# Patient Record
Sex: FEMALE | Race: WHITE | NOT HISPANIC OR LATINO | Employment: OTHER | URBAN - NONMETROPOLITAN AREA
[De-identification: names, ages, dates, MRNs, and addresses within clinical notes are randomized per-mention and may not be internally consistent; named-entity substitution may affect disease eponyms.]

---

## 2017-03-19 ENCOUNTER — HOSPITAL ENCOUNTER (EMERGENCY)
Facility: HOSPITAL | Age: 77
Discharge: HOME | End: 2017-03-19
Attending: EMERGENCY MEDICINE | Admitting: EMERGENCY MEDICINE
Payer: MEDICARE

## 2017-03-19 ENCOUNTER — APPOINTMENT (EMERGENCY)
Dept: RADIOLOGY | Facility: HOSPITAL | Age: 77
End: 2017-03-19
Payer: MEDICARE

## 2017-03-19 VITALS
OXYGEN SATURATION: 96 % | BODY MASS INDEX: 31.58 KG/M2 | HEIGHT: 64 IN | HEART RATE: 64 BPM | DIASTOLIC BLOOD PRESSURE: 65 MMHG | TEMPERATURE: 97.8 F | WEIGHT: 185 LBS | SYSTOLIC BLOOD PRESSURE: 143 MMHG | RESPIRATION RATE: 18 BRPM

## 2017-03-19 DIAGNOSIS — M70.62 TROCHANTERIC BURSITIS OF LEFT HIP: ICD-10-CM

## 2017-03-19 DIAGNOSIS — M79.672 LEFT FOOT PAIN: ICD-10-CM

## 2017-03-19 DIAGNOSIS — M25.552 LEFT HIP PAIN: Primary | ICD-10-CM

## 2017-03-19 PROCEDURE — 99283 EMERGENCY DEPT VISIT LOW MDM: CPT

## 2017-03-19 PROCEDURE — 73630 X-RAY EXAM OF FOOT: CPT

## 2017-03-19 PROCEDURE — 73552 X-RAY EXAM OF FEMUR 2/>: CPT

## 2017-03-19 PROCEDURE — 73502 X-RAY EXAM HIP UNI 2-3 VIEWS: CPT

## 2017-03-19 RX ORDER — METOPROLOL SUCCINATE 25 MG/1
25 TABLET, EXTENDED RELEASE ORAL DAILY
COMMUNITY

## 2017-03-19 RX ORDER — PREDNISONE 20 MG/1
40 TABLET ORAL ONCE
Status: COMPLETED | OUTPATIENT
Start: 2017-03-19 | End: 2017-03-19

## 2017-03-19 RX ORDER — NAPROXEN 250 MG/1
500 TABLET ORAL ONCE
Status: COMPLETED | OUTPATIENT
Start: 2017-03-19 | End: 2017-03-19

## 2017-03-19 RX ORDER — AMLODIPINE BESYLATE 10 MG/1
10 TABLET ORAL DAILY
COMMUNITY

## 2017-03-19 RX ORDER — PREDNISONE 20 MG/1
40 TABLET ORAL DAILY
Qty: 8 TABLET | Refills: 0 | Status: SHIPPED | OUTPATIENT
Start: 2017-03-19 | End: 2017-03-23

## 2017-03-19 RX ORDER — TRAMADOL HYDROCHLORIDE 50 MG/1
50 TABLET ORAL ONCE
Status: COMPLETED | OUTPATIENT
Start: 2017-03-19 | End: 2017-03-19

## 2017-03-19 RX ORDER — LIDOCAINE 50 MG/G
1 PATCH TOPICAL EVERY 24 HOURS
Qty: 30 PATCH | Refills: 0 | Status: SHIPPED | OUTPATIENT
Start: 2017-03-19 | End: 2017-04-18

## 2017-03-19 RX ADMIN — NAPROXEN 500 MG: 250 TABLET ORAL at 14:23

## 2017-03-19 RX ADMIN — PREDNISONE 40 MG: 20 TABLET ORAL at 15:31

## 2017-03-19 RX ADMIN — TRAMADOL HYDROCHLORIDE 50 MG: 50 TABLET, COATED ORAL at 14:23

## 2017-10-09 ENCOUNTER — HOSPITAL ENCOUNTER (OUTPATIENT)
Dept: CT IMAGING | Age: 77
Discharge: HOME | End: 2017-10-09
Attending: INTERNAL MEDICINE
Payer: MEDICARE

## 2017-10-09 DIAGNOSIS — M79.10 MYALGIA: ICD-10-CM

## 2017-10-09 DIAGNOSIS — M51.36 DEGENERATIVE DISC DISEASE, LUMBAR: ICD-10-CM

## 2017-10-09 PROCEDURE — 72131 CT LUMBAR SPINE W/O DYE: CPT

## 2017-12-08 VITALS
RESPIRATION RATE: 20 BRPM | OXYGEN SATURATION: 94 % | DIASTOLIC BLOOD PRESSURE: 62 MMHG | SYSTOLIC BLOOD PRESSURE: 101 MMHG | TEMPERATURE: 98 F | HEART RATE: 62 BPM

## 2017-12-08 LAB
ALBUMIN SERPL ELPH-MCNC: 4 G/DL — SIGNIFICANT CHANGE UP (ref 3.4–5)
ALP SERPL-CCNC: 209 U/L — HIGH (ref 40–120)
ALT FLD-CCNC: 146 U/L — HIGH (ref 12–42)
ANION GAP SERPL CALC-SCNC: 12 MMOL/L — SIGNIFICANT CHANGE UP (ref 9–16)
AST SERPL-CCNC: 189 U/L — HIGH (ref 15–37)
BASOPHILS NFR BLD AUTO: 0.3 % — SIGNIFICANT CHANGE UP (ref 0–2)
BILIRUB SERPL-MCNC: 1.6 MG/DL — HIGH (ref 0.2–1.2)
BUN SERPL-MCNC: 57 MG/DL — HIGH (ref 7–23)
CALCIUM SERPL-MCNC: 9.2 MG/DL — SIGNIFICANT CHANGE UP (ref 8.5–10.5)
CHLORIDE SERPL-SCNC: 101 MMOL/L — SIGNIFICANT CHANGE UP (ref 96–108)
CO2 SERPL-SCNC: 25 MMOL/L — SIGNIFICANT CHANGE UP (ref 22–31)
CREAT SERPL-MCNC: 1.58 MG/DL — HIGH (ref 0.5–1.3)
EOSINOPHIL NFR BLD AUTO: 1.1 % — SIGNIFICANT CHANGE UP (ref 0–6)
GLUCOSE SERPL-MCNC: 159 MG/DL — HIGH (ref 70–99)
HCT VFR BLD CALC: 37.7 % — SIGNIFICANT CHANGE UP (ref 34.5–45)
HGB BLD-MCNC: 12.6 G/DL — SIGNIFICANT CHANGE UP (ref 11.5–15.5)
IMM GRANULOCYTES NFR BLD AUTO: 1 % — SIGNIFICANT CHANGE UP (ref 0–1.5)
LIDOCAIN IGE QN: 815 U/L — HIGH (ref 73–393)
LYMPHOCYTES # BLD AUTO: 9.5 % — LOW (ref 13–44)
MCHC RBC-ENTMCNC: 31.3 PG — SIGNIFICANT CHANGE UP (ref 27–34)
MCHC RBC-ENTMCNC: 33.4 G/DL — SIGNIFICANT CHANGE UP (ref 32–36)
MCV RBC AUTO: 93.8 FL — SIGNIFICANT CHANGE UP (ref 80–100)
MONOCYTES NFR BLD AUTO: 7.2 % — SIGNIFICANT CHANGE UP (ref 2–14)
NEUTROPHILS NFR BLD AUTO: 80.9 % — HIGH (ref 43–77)
PLATELET # BLD AUTO: 277 K/UL — SIGNIFICANT CHANGE UP (ref 150–400)
POTASSIUM SERPL-MCNC: 3.3 MMOL/L — LOW (ref 3.5–5.3)
POTASSIUM SERPL-SCNC: 3.3 MMOL/L — LOW (ref 3.5–5.3)
PROT SERPL-MCNC: 7.5 G/DL — SIGNIFICANT CHANGE UP (ref 6.4–8.2)
RBC # BLD: 4.02 M/UL — SIGNIFICANT CHANGE UP (ref 3.8–5.2)
RBC # FLD: 12.6 % — SIGNIFICANT CHANGE UP (ref 10.3–16.9)
SODIUM SERPL-SCNC: 138 MMOL/L — SIGNIFICANT CHANGE UP (ref 132–145)
WBC # BLD: 7 K/UL — SIGNIFICANT CHANGE UP (ref 3.8–10.5)
WBC # FLD AUTO: 7 K/UL — SIGNIFICANT CHANGE UP (ref 3.8–10.5)

## 2017-12-08 PROCEDURE — 99285 EMERGENCY DEPT VISIT HI MDM: CPT

## 2017-12-08 RX ORDER — ONDANSETRON 8 MG/1
4 TABLET, FILM COATED ORAL ONCE
Qty: 0 | Refills: 0 | Status: COMPLETED | OUTPATIENT
Start: 2017-12-08 | End: 2017-12-08

## 2017-12-08 RX ORDER — FAMOTIDINE 10 MG/ML
20 INJECTION INTRAVENOUS ONCE
Qty: 0 | Refills: 0 | Status: COMPLETED | OUTPATIENT
Start: 2017-12-08 | End: 2017-12-08

## 2017-12-08 RX ORDER — CLOPIDOGREL BISULFATE 75 MG/1
1 TABLET, FILM COATED ORAL
Qty: 0 | Refills: 0 | COMMUNITY

## 2017-12-08 RX ORDER — METOPROLOL TARTRATE 50 MG
1 TABLET ORAL
Qty: 0 | Refills: 0 | COMMUNITY

## 2017-12-08 RX ORDER — OMEPRAZOLE 10 MG/1
1 CAPSULE, DELAYED RELEASE ORAL
Qty: 0 | Refills: 0 | COMMUNITY

## 2017-12-08 RX ORDER — IOHEXOL 300 MG/ML
50 INJECTION, SOLUTION INTRAVENOUS ONCE
Qty: 0 | Refills: 0 | Status: COMPLETED | OUTPATIENT
Start: 2017-12-08 | End: 2017-12-08

## 2017-12-08 RX ORDER — SODIUM CHLORIDE 9 MG/ML
1000 INJECTION INTRAMUSCULAR; INTRAVENOUS; SUBCUTANEOUS ONCE
Qty: 0 | Refills: 0 | Status: COMPLETED | OUTPATIENT
Start: 2017-12-08 | End: 2017-12-08

## 2017-12-08 RX ORDER — FUROSEMIDE 40 MG
1 TABLET ORAL
Qty: 0 | Refills: 0 | COMMUNITY

## 2017-12-08 RX ADMIN — ONDANSETRON 4 MILLIGRAM(S): 8 TABLET, FILM COATED ORAL at 21:24

## 2017-12-08 RX ADMIN — IOHEXOL 50 MILLILITER(S): 300 INJECTION, SOLUTION INTRAVENOUS at 21:24

## 2017-12-08 RX ADMIN — FAMOTIDINE 20 MILLIGRAM(S): 10 INJECTION INTRAVENOUS at 21:24

## 2017-12-08 RX ADMIN — SODIUM CHLORIDE 1000 MILLILITER(S): 9 INJECTION INTRAMUSCULAR; INTRAVENOUS; SUBCUTANEOUS at 21:24

## 2017-12-08 NOTE — ED PROVIDER NOTE - ENMT, MLM
Airway patent, Nasal mucosa clear. Mouth with normal mucosa. Throat has no vesicles, no oropharyngeal exudates and uvula is midline. appears dehydrated

## 2017-12-08 NOTE — ED ADULT TRIAGE NOTE - CHIEF COMPLAINT QUOTE
BIBA for nausea and vomiting since this morning. Denies any abdominal pain. States she began to feel weak and decided to call 911.

## 2017-12-08 NOTE — ED PROVIDER NOTE - OBJECTIVE STATEMENT
77 y.o. female with c/o N/V acute onset this morning, ate burger last night, has been on a low carb diet for several months, with weight loss (intentional), able to eat and drink some liquids over the course of the day but nausea was persistent, assoc with gen weakness and fatigue, near syncope today [place] [employer] [place].

## 2017-12-08 NOTE — ED PROVIDER NOTE - MEDICAL DECISION MAKING DETAILS
labs noted with elevated LFTs, TB, lipase, c/w possible gallstone pancreatitis/ biliary ductal blockage, CT abdomen was read as changes c/w post-cholecystectomy state.  I consulted surgery dr chicas and he recc admitting patient for further workup, as she may need additional imaging/workup.

## 2017-12-09 ENCOUNTER — INPATIENT (INPATIENT)
Facility: HOSPITAL | Age: 77
LOS: 0 days | Discharge: HOME | DRG: 446 | End: 2017-12-10
Attending: SURGERY | Admitting: SURGERY
Payer: COMMERCIAL

## 2017-12-09 DIAGNOSIS — I67.1 CEREBRAL ANEURYSM, NONRUPTURED: Chronic | ICD-10-CM

## 2017-12-09 DIAGNOSIS — Z98.890 OTHER SPECIFIED POSTPROCEDURAL STATES: Chronic | ICD-10-CM

## 2017-12-09 LAB
ALBUMIN SERPL ELPH-MCNC: 3.2 G/DL — LOW (ref 3.3–5)
ALP SERPL-CCNC: 137 U/L — HIGH (ref 40–120)
ALT FLD-CCNC: 88 U/L — HIGH (ref 10–45)
ANION GAP SERPL CALC-SCNC: 13 MMOL/L — SIGNIFICANT CHANGE UP (ref 5–17)
APTT BLD: 32.3 SEC — SIGNIFICANT CHANGE UP (ref 27.5–37.4)
AST SERPL-CCNC: 84 U/L — HIGH (ref 10–40)
BILIRUB SERPL-MCNC: 0.8 MG/DL — SIGNIFICANT CHANGE UP (ref 0.2–1.2)
BLD GP AB SCN SERPL QL: NEGATIVE — SIGNIFICANT CHANGE UP
BUN SERPL-MCNC: 39 MG/DL — HIGH (ref 7–23)
CALCIUM SERPL-MCNC: 8.6 MG/DL — SIGNIFICANT CHANGE UP (ref 8.4–10.5)
CHLORIDE SERPL-SCNC: 104 MMOL/L — SIGNIFICANT CHANGE UP (ref 96–108)
CO2 SERPL-SCNC: 21 MMOL/L — LOW (ref 22–31)
CREAT SERPL-MCNC: 1.15 MG/DL — SIGNIFICANT CHANGE UP (ref 0.5–1.3)
GLUCOSE SERPL-MCNC: 95 MG/DL — SIGNIFICANT CHANGE UP (ref 70–99)
HCT VFR BLD CALC: 32.2 % — LOW (ref 34.5–45)
HGB BLD-MCNC: 10.7 G/DL — LOW (ref 11.5–15.5)
INR BLD: 1.01 — SIGNIFICANT CHANGE UP (ref 0.88–1.16)
MAGNESIUM SERPL-MCNC: 1.6 MG/DL — SIGNIFICANT CHANGE UP (ref 1.6–2.6)
MCHC RBC-ENTMCNC: 30.9 PG — SIGNIFICANT CHANGE UP (ref 27–34)
MCHC RBC-ENTMCNC: 33.2 G/DL — SIGNIFICANT CHANGE UP (ref 32–36)
MCV RBC AUTO: 93.1 FL — SIGNIFICANT CHANGE UP (ref 80–100)
PHOSPHATE SERPL-MCNC: 3.7 MG/DL — SIGNIFICANT CHANGE UP (ref 2.5–4.5)
PLATELET # BLD AUTO: 219 K/UL — SIGNIFICANT CHANGE UP (ref 150–400)
POTASSIUM SERPL-MCNC: 3.1 MMOL/L — LOW (ref 3.5–5.3)
POTASSIUM SERPL-SCNC: 3.1 MMOL/L — LOW (ref 3.5–5.3)
PROT SERPL-MCNC: 5.8 G/DL — LOW (ref 6–8.3)
PROTHROM AB SERPL-ACNC: 11.2 SEC — SIGNIFICANT CHANGE UP (ref 9.8–12.7)
RBC # BLD: 3.46 M/UL — LOW (ref 3.8–5.2)
RBC # FLD: 13.2 % — SIGNIFICANT CHANGE UP (ref 10.3–16.9)
RH IG SCN BLD-IMP: POSITIVE — SIGNIFICANT CHANGE UP
SODIUM SERPL-SCNC: 138 MMOL/L — SIGNIFICANT CHANGE UP (ref 135–145)
WBC # BLD: 4.6 K/UL — SIGNIFICANT CHANGE UP (ref 3.8–10.5)
WBC # FLD AUTO: 4.6 K/UL — SIGNIFICANT CHANGE UP (ref 3.8–10.5)

## 2017-12-09 PROCEDURE — 74177 CT ABD & PELVIS W/CONTRAST: CPT | Mod: 26

## 2017-12-09 PROCEDURE — 71010: CPT | Mod: 26

## 2017-12-09 PROCEDURE — 76705 ECHO EXAM OF ABDOMEN: CPT | Mod: 26

## 2017-12-09 RX ORDER — POTASSIUM CHLORIDE 20 MEQ
10 PACKET (EA) ORAL
Qty: 0 | Refills: 0 | Status: COMPLETED | OUTPATIENT
Start: 2017-12-09 | End: 2017-12-10

## 2017-12-09 RX ORDER — SODIUM CHLORIDE 9 MG/ML
1000 INJECTION INTRAMUSCULAR; INTRAVENOUS; SUBCUTANEOUS
Qty: 0 | Refills: 0 | Status: DISCONTINUED | OUTPATIENT
Start: 2017-12-09 | End: 2017-12-10

## 2017-12-09 RX ORDER — HYDROMORPHONE HYDROCHLORIDE 2 MG/ML
0.5 INJECTION INTRAMUSCULAR; INTRAVENOUS; SUBCUTANEOUS EVERY 6 HOURS
Qty: 0 | Refills: 0 | Status: DISCONTINUED | OUTPATIENT
Start: 2017-12-09 | End: 2017-12-10

## 2017-12-09 RX ORDER — HYDROMORPHONE HYDROCHLORIDE 2 MG/ML
1 INJECTION INTRAMUSCULAR; INTRAVENOUS; SUBCUTANEOUS EVERY 6 HOURS
Qty: 0 | Refills: 0 | Status: DISCONTINUED | OUTPATIENT
Start: 2017-12-09 | End: 2017-12-10

## 2017-12-09 RX ORDER — ONDANSETRON 8 MG/1
4 TABLET, FILM COATED ORAL EVERY 6 HOURS
Qty: 0 | Refills: 0 | Status: DISCONTINUED | OUTPATIENT
Start: 2017-12-09 | End: 2017-12-10

## 2017-12-09 RX ORDER — POTASSIUM CHLORIDE 20 MEQ
10 PACKET (EA) ORAL ONCE
Qty: 0 | Refills: 0 | Status: COMPLETED | OUTPATIENT
Start: 2017-12-09 | End: 2017-12-09

## 2017-12-09 RX ORDER — HEPARIN SODIUM 5000 [USP'U]/ML
5000 INJECTION INTRAVENOUS; SUBCUTANEOUS EVERY 12 HOURS
Qty: 0 | Refills: 0 | Status: DISCONTINUED | OUTPATIENT
Start: 2017-12-09 | End: 2017-12-10

## 2017-12-09 RX ADMIN — HEPARIN SODIUM 5000 UNIT(S): 5000 INJECTION INTRAVENOUS; SUBCUTANEOUS at 07:06

## 2017-12-09 RX ADMIN — Medication 100 MILLIEQUIVALENT(S): at 23:53

## 2017-12-09 RX ADMIN — HEPARIN SODIUM 5000 UNIT(S): 5000 INJECTION INTRAVENOUS; SUBCUTANEOUS at 18:20

## 2017-12-09 RX ADMIN — Medication 100 MILLIEQUIVALENT(S): at 18:20

## 2017-12-09 RX ADMIN — SODIUM CHLORIDE 150 MILLILITER(S): 9 INJECTION INTRAMUSCULAR; INTRAVENOUS; SUBCUTANEOUS at 05:49

## 2017-12-09 RX ADMIN — Medication 100 MILLIEQUIVALENT(S): at 22:52

## 2017-12-09 NOTE — CONSULT NOTE ADULT - ASSESSMENT
76 YO F h/o HTN, s/p b/l endarterectomy (1994, 1995) c/b CVA with no residual deficits, s/p RYGB with cholecystectomy (1981), c/p craniotomy 2/2 brain aneurysm x2 (both in 1992), c/p left lobectomy for lung CA (2016), CAD s/p cardiac stent placement (2004)   p/w 4-5 episodes of NBNB vomiting and generalized weakness.    # Elevated transaminases  - Pt noted to have dilated CBD and intrahepatic ducts on CT scan, however, unable to obtain MRI d/t h/o aneurysm clips  - D/t pts h/o of RYGB, pt would require a double balloon ERCP, which is unable to be performed at [facility], however, her LFTs are now downtrending  - CBD dilation and intrahepatic dilation can be seen in the setting of cholecystectomy and gastric bypass  - Afebrile, HD stable, without abdominal pain  - Follow-up RUQ U/S  - If persistent concern for biliary obstruction, can consider performing EUS on Monday  - Monitor daily LFTs    Case d/w Dr. Willis  GI will follow

## 2017-12-09 NOTE — PROGRESS NOTE ADULT - SUBJECTIVE AND OBJECTIVE BOX
SUBJECTIVE: Patient seen and examined bedside by surgery team. Denies AEO, denies pain.     heparin  Injectable 5000 Unit(s) SubCutaneous every 12 hours      Vital Signs Last 24 Hrs  T(C): 36.4 (09 Dec 2017 04:16), Max: 36.9 (09 Dec 2017 01:28)  T(F): 97.6 (09 Dec 2017 04:16), Max: 98.5 (09 Dec 2017 01:28)  HR: 63 (09 Dec 2017 04:16) (62 - 63)  BP: 104/59 (09 Dec 2017 04:16) (101/62 - 104/61)  BP(mean): --  RR: 17 (09 Dec 2017 04:16) (17 - 20)  SpO2: 94% (09 Dec 2017 04:16) (94% - 94%)  I&O's Detail    08 Dec 2017 07:01  -  09 Dec 2017 07:00  --------------------------------------------------------  IN:    sodium chloride 0.9%.: 600 mL  Total IN: 600 mL    OUT:  Total OUT: 0 mL    Total NET: 600 mL            Physical Exam  General: NAD, alert, interactive, appears comfortable  C/V: NSR  Pulm: Nonlabored breathing, no respiratory distress  Abd: softly distended, NT/ND.  Extrem: WWP, no edema, SCDs in place        LABS:                        12.6   7.0   )-----------( 277      ( 08 Dec 2017 21:27 )             37.7     12-08    138  |  101  |  57<H>  ----------------------------<  159<H>  3.3<L>   |  25  |  1.58<H>    Ca    9.2      08 Dec 2017 21:27    TPro  7.5  /  Alb  4.0  /  TBili  1.6<H>  /  DBili  x   /  AST  189<H>  /  ALT  146<H>  /  AlkPhos  209<H>  12-08          RADIOLOGY & ADDITIONAL STUDIES:

## 2017-12-09 NOTE — CONSULT NOTE ADULT - SUBJECTIVE AND OBJECTIVE BOX
HPI:  78 YO F visiting from [place] h/o HTN, s/p b/l endarterectomy (1994, 1995) c/b CVA with no residual deficits, s/p RYGB with cholecystectomy (1981), c/p craniotomy 2/2 brain aneurysm x2 (both in 1992), c/p left lobectomy for lung CA (2016), CAD s/p cardiac stent placement (2004)   p/w 4-5 episodes of NBNB vomiting and generalized weakness. Pt reports chronic intermittent diarrhea, denies fever, chills, CP, SOB, abdominal pain, melena, hematochezia, jaundice, itching, pale stools.     EGD: >10 years ago  Last colonoscopy: 2-3 years ago, unremarkable, recommended repeat in 10 years    Allergies  ciprofloxacin (Hives)  codeine (Rash)    Home Medications:  Lasix 20 mg oral tablet: 1 tab(s) orally once a day (08 Dec 2017 21:04)  metoprolol succinate 25 mg oral tablet, extended release: 1 tab(s) orally once a day (08 Dec 2017 21:05)  omeprazole 20 mg oral delayed release capsule: 1 cap(s) orally once a day (08 Dec 2017 21:05)  Plavix 75 mg oral tablet: 1 tab(s) orally once a day (08 Dec 2017 21:05)    MEDICATIONS:  MEDICATIONS  (STANDING):  heparin  Injectable 5000 Unit(s) SubCutaneous every 12 hours  potassium chloride  10 mEq/100 mL IVPB 10 milliEquivalent(s) IV Intermittent once  sodium chloride 0.9%. 1000 milliLiter(s) (150 mL/Hr) IV Continuous <Continuous>    MEDICATIONS  (PRN):  HYDROmorphone  Injectable 0.5 milliGRAM(s) IV Push every 6 hours PRN Moderate Pain  HYDROmorphone  Injectable 1 milliGRAM(s) IV Push every 6 hours PRN Severe Pain  ondansetron Injectable 4 milliGRAM(s) IV Push every 6 hours PRN Nausea    PAST MEDICAL & SURGICAL HISTORY:  Peripheral vascular disease  Essential hypertension  Cerebral aneurysm  H/O carotid endarterectomy    FAMILY HISTORY:  Mother: ruptured brain aneurysm  Father: MI  No family history of colon CA, gastric CA, or liver disease    SOCIAL HISTORY:  [place]: Former smoker for 30 years, quit 25 years ago  Alcohol: 5-6 glasses of wine per weak  Illicit Drugs: Denies    REVIEW OF SYSTEMS: per HPI    Vital Signs Last 24 Hrs  T(C): 37 (09 Dec 2017 10:51), Max: 37 (09 Dec 2017 10:51)  T(F): 98.6 (09 Dec 2017 10:51), Max: 98.6 (09 Dec 2017 10:51)  HR: 63 (09 Dec 2017 10:51) (62 - 63)  BP: 115/68 (09 Dec 2017 10:51) (101/62 - 115/68)  BP(mean): --  RR: 18 (09 Dec 2017 10:51) (17 - 20)  SpO2: 99% (09 Dec 2017 10:51) (94% - 99%)    12-08 @ 07:01  -  12-09 @ 07:00  --------------------------------------------------------  IN: 600 mL / OUT: 0 mL / NET: 600 mL    PHYSICAL EXAM:    General: Well developed; well nourished; in no acute distress  HEENT: MMM, conjunctiva and sclera clear. No scleral icterus  Gastrointestinal: Soft, non-tender non-distended; Negative Campos's sign No rebound or guarding  Extremities: Normal range of motion, No clubbing, cyanosis or edema  Neurological: Alert and oriented x3  Skin: Warm and dry. No obvious rash    LABS:                        10.7   4.6   )-----------( 219      ( 09 Dec 2017 11:20 )             32.2     12-09    138  |  104  |  39<H>  ----------------------------<  95  3.1<L>   |  21<L>  |  1.15    Ca    8.6      09 Dec 2017 11:20  Phos  3.7     12-09  Mg     1.6     12-09    TPro  5.8<L>  /  Alb  3.2<L>  /  TBili  0.8  /  DBili  x   /  AST  84<H>  /  ALT  88<H>  /  AlkPhos  137<H>  12-09    RADIOLOGY & ADDITIONAL STUDIES:  CT Abdomen and Pelvis w/ Oral Cont and w/ IV Cont (12.09.17 @ 00:21)  FINDINGS: Images of the lower chest demonstrate bibasilar dependent   atelectasis.    Visualized portion of the liver demonstrates no focal lesions. Status   post cholecystectomy. Significant dilatation of theCBD measuring 1.7 cm.   There is intrahepatic biliary dilatation.  The pancreas is normal in   appearance.  No splenic abnormalities are seen.    The adrenal glands are unremarkable. Left renal cysts measuring up to 3.5   cm. Additional subcentimeterhypodensities in both kidneys are too small   to characterize. No hydronephrosis.      No abdominal aortic aneurysm is seen. No lymphadenopathy is seen.   Significant atherosclerosis involving the aorta and its major branches.   Atherosclerosis of the iliac arteries.    Evaluation of the bowel demonstrates evidence of prior partial   gastrectomy. Colonic diverticulosis most significant in the sigmoid colon   without evidence of diverticulitis. Appearance of wall thickening   involving the sigmoidcolon probably related to sequelae of prior   inflammatory/infectious process. Normal appendix. No free air. No ascites   is seen.    Images of the pelvis demonstrate no uterine or adnexal mass lesions. No   filling defects in the bladder. Small bilateral fat-containing inguinal   hernias. No significant pelvic lymphadenopathy.     Evaluation of the osseous structures demonstrates generalized osteopenia.   Spinal degenerative changes most significant at L5/S1.      IMPRESSION:  Normal appendix. No acute intra-abdominal findings. Colonic   diverticulosis without evidence of diverticulitis. Status post   cholecystectomy. Significant dilatation of the CBD and intrahepatic bile   ducts which appears prominent accounting for postcholecystectomy state.   Consider follow-up and correlation with MR/MRCP on nonemergent basis.

## 2017-12-09 NOTE — PROGRESS NOTE ADULT - ASSESSMENT
76 yo F PMH HTN, stroke (1992), presents with emesis and weakness w/o syncope or LOC.   -CT Abd showed dilatation of CBD (1.7 cm)   -NPO, IV fluids   [ ] f/u MRCP   [ ] GI consult Zlatanic 78 yo F PMH HTN, stroke (1992), presents with emesis and weakness w/o syncope or LOC.   -CT Abd showed dilatation of CBD (1.7 cm)   -NPO, IV fluids  -LFT's downtrending  [ ] GI consult Lazaro, per GI unable to perform ERCP given her hx of gastric bypass, possible EUS on Monday

## 2017-12-09 NOTE — H&P ADULT - NSHPPHYSICALEXAM_GEN_ALL_CORE
General: NAD, anicteric sclera, resting comfortably in bed  C/V: NSR  Pulm: Nonlabored breathing, no respiratory distress  Abd: soft, ND, diffusely NT to palpation.  Extrem: WWP, no edema, SCDs in place

## 2017-12-09 NOTE — H&P ADULT - HISTORY OF PRESENT ILLNESS
Mr. Fowler is a 77 year old with a PMH of HTN, stroke (1992) and PSx of gastric bypass (1981), cholecystectomy (1981), craniotomy 2/2 brain aneurysm x2 (both in 1992), b/l endarterectomy (1994, 1995), excision of lung CA from left lung (2016), cardiac stent placement (2004), presenting with emesis and weakness w/o syncope or LOC. Yesterday morning, Ms. Fowler reports waking up nauseous. She reports emesis (NBNB) x3. She states that by the afternoon her symptoms improved. The previous night, she at eaten hamburgers with her . Her  did not have any symptoms. She also denies sick contacts. In the afternoon, when spending time with her family, she reports sudden severe weakness and fatigue. Upon eating her symptoms mildly improved, but persisted into the evening, prompting her visit to the [facility] ED. She denies syncope or LOC.    On admission, she presented with elevated LFTs and lipase. No leukocytosis. She was afebrile. CT scan showed CBD dilation to 1.7 cm along with intrahepatic biliary duct dilation.     She has regular daily bowel movements - her last bowel movement was yesterday.    Of note, she reports to 20 lb intentional weight loss over the past 5 weeks due to a low-carbohydrate diet.     Pt reports that she was previously told she is unable to have MRI due to metal clips placed to treat her aneurysm.    PSx: as above  PMH: as above  Meds: as below  Allergies: Cipro (anaphylaxis)  Social Hx: smoker for 30 years, last cigarette 25 years ago. 6-9 drinks of ETOH per week. No illicit drug use.  FH: mother passed away in her 50s from ruptured brain aneurysm. Father passed away in his 50s from MI. Ms. Fowler is a 77 year old F with a PMH of HTN, stroke (1992) and PSx of gastric bypass (1981), cholecystectomy (1981), craniotomy 2/2 brain aneurysm x2 (both in 1992), b/l endarterectomy (1994, 1995), excision of lung CA from left lung (2016), cardiac stent placement (2004), presenting with emesis and weakness w/o syncope or LOC. Yesterday morning, Ms. Fowler reports waking up nauseous. She reports emesis (NBNB) x3. She states that by the afternoon her symptoms improved. The previous night, she at eaten hamburgers with her . Her  did not have any symptoms. She also denies sick contacts. In the afternoon, when spending time with her family, she reports sudden severe weakness and fatigue. Upon eating her symptoms mildly improved, but persisted into the evening, prompting her visit to the [facility] ED. She denies syncope or LOC.    On admission, she presented with elevated LFTs and lipase. No leukocytosis. She was afebrile. CT scan showed CBD dilation to 1.7 cm along with intrahepatic biliary duct dilation.     She has regular daily bowel movements - her last bowel movement was yesterday.    Of note, she reports to 20 lb intentional weight loss over the past 5 weeks due to a low-carbohydrate diet.     Pt reports that she was previously told she is unable to have MRI due to metal clips placed to treat her aneurysm.    PSx: as above  PMH: as above  Meds: as below  Allergies: Cipro (anaphylaxis)  Social Hx: smoker for 30 years, last cigarette 25 years ago. 6-9 drinks of ETOH per week. No illicit drug use.  FH: mother passed away in her 50s from ruptured brain aneurysm. Father passed away in his 50s from MI.

## 2017-12-09 NOTE — PATIENT PROFILE ADULT. - REASON FOR ADMISSION
"I woke up yesterday morning and started with vomiting and weakness. I vomited three times. I tried to continue about the day but I could not."

## 2017-12-09 NOTE — H&P ADULT - ASSESSMENT
Ms. Fowler is a 76 yo F, presenting with emesis and generalized fatigue and weakness with elevated LFTs and lipase.   - Admit to regional  - Abdominal U/S  - MRCP (pt previously told that she is unable to obtain MRI to the clips placed during treatment of brain aneurysm)  - G/I consult for ERCP  - NPO  - IVF  - Plan discussed with chief on call and attending

## 2017-12-10 VITALS
RESPIRATION RATE: 18 BRPM | OXYGEN SATURATION: 95 % | DIASTOLIC BLOOD PRESSURE: 57 MMHG | TEMPERATURE: 100 F | HEART RATE: 75 BPM | SYSTOLIC BLOOD PRESSURE: 132 MMHG

## 2017-12-10 LAB
ALBUMIN SERPL ELPH-MCNC: 3 G/DL — LOW (ref 3.3–5)
ALP SERPL-CCNC: 113 U/L — SIGNIFICANT CHANGE UP (ref 40–120)
ALT FLD-CCNC: 60 U/L — HIGH (ref 10–45)
ANION GAP SERPL CALC-SCNC: 11 MMOL/L — SIGNIFICANT CHANGE UP (ref 5–17)
AST SERPL-CCNC: 37 U/L — SIGNIFICANT CHANGE UP (ref 10–40)
BILIRUB SERPL-MCNC: 0.5 MG/DL — SIGNIFICANT CHANGE UP (ref 0.2–1.2)
BUN SERPL-MCNC: 21 MG/DL — SIGNIFICANT CHANGE UP (ref 7–23)
CALCIUM SERPL-MCNC: 8.1 MG/DL — LOW (ref 8.4–10.5)
CHLORIDE SERPL-SCNC: 109 MMOL/L — HIGH (ref 96–108)
CO2 SERPL-SCNC: 21 MMOL/L — LOW (ref 22–31)
CREAT SERPL-MCNC: 0.97 MG/DL — SIGNIFICANT CHANGE UP (ref 0.5–1.3)
GLUCOSE SERPL-MCNC: 98 MG/DL — SIGNIFICANT CHANGE UP (ref 70–99)
HCT VFR BLD CALC: 31.2 % — LOW (ref 34.5–45)
HGB BLD-MCNC: 10.5 G/DL — LOW (ref 11.5–15.5)
MAGNESIUM SERPL-MCNC: 1.4 MG/DL — LOW (ref 1.6–2.6)
MCHC RBC-ENTMCNC: 31.7 PG — SIGNIFICANT CHANGE UP (ref 27–34)
MCHC RBC-ENTMCNC: 33.7 G/DL — SIGNIFICANT CHANGE UP (ref 32–36)
MCV RBC AUTO: 94.3 FL — SIGNIFICANT CHANGE UP (ref 80–100)
PHOSPHATE SERPL-MCNC: 3 MG/DL — SIGNIFICANT CHANGE UP (ref 2.5–4.5)
PLATELET # BLD AUTO: 209 K/UL — SIGNIFICANT CHANGE UP (ref 150–400)
POTASSIUM SERPL-MCNC: 3.7 MMOL/L — SIGNIFICANT CHANGE UP (ref 3.5–5.3)
POTASSIUM SERPL-SCNC: 3.7 MMOL/L — SIGNIFICANT CHANGE UP (ref 3.5–5.3)
PROT SERPL-MCNC: 5.5 G/DL — LOW (ref 6–8.3)
RBC # BLD: 3.31 M/UL — LOW (ref 3.8–5.2)
RBC # FLD: 13.5 % — SIGNIFICANT CHANGE UP (ref 10.3–16.9)
SODIUM SERPL-SCNC: 141 MMOL/L — SIGNIFICANT CHANGE UP (ref 135–145)
WBC # BLD: 5.7 K/UL — SIGNIFICANT CHANGE UP (ref 3.8–10.5)
WBC # FLD AUTO: 5.7 K/UL — SIGNIFICANT CHANGE UP (ref 3.8–10.5)

## 2017-12-10 RX ORDER — CELECOXIB 200 MG/1
200 CAPSULE ORAL DAILY
Qty: 0 | Refills: 0 | Status: DISCONTINUED | OUTPATIENT
Start: 2017-12-10 | End: 2017-12-10

## 2017-12-10 RX ORDER — PANTOPRAZOLE SODIUM 20 MG/1
40 TABLET, DELAYED RELEASE ORAL
Qty: 0 | Refills: 0 | Status: DISCONTINUED | OUTPATIENT
Start: 2017-12-10 | End: 2017-12-10

## 2017-12-10 RX ORDER — ACETAMINOPHEN 500 MG
1 TABLET ORAL
Qty: 0 | Refills: 0 | COMMUNITY
Start: 2017-12-10

## 2017-12-10 RX ORDER — CELECOXIB 200 MG/1
1 CAPSULE ORAL
Qty: 0 | Refills: 0 | COMMUNITY
Start: 2017-12-10

## 2017-12-10 RX ORDER — FUROSEMIDE 40 MG
20 TABLET ORAL DAILY
Qty: 0 | Refills: 0 | Status: DISCONTINUED | OUTPATIENT
Start: 2017-12-10 | End: 2017-12-10

## 2017-12-10 RX ORDER — CLOPIDOGREL BISULFATE 75 MG/1
75 TABLET, FILM COATED ORAL DAILY
Qty: 0 | Refills: 0 | Status: DISCONTINUED | OUTPATIENT
Start: 2017-12-10 | End: 2017-12-10

## 2017-12-10 RX ORDER — ACETAMINOPHEN 500 MG
325 TABLET ORAL EVERY 4 HOURS
Qty: 0 | Refills: 0 | Status: DISCONTINUED | OUTPATIENT
Start: 2017-12-10 | End: 2017-12-10

## 2017-12-10 RX ORDER — OXYCODONE AND ACETAMINOPHEN 5; 325 MG/1; MG/1
1 TABLET ORAL EVERY 4 HOURS
Qty: 0 | Refills: 0 | Status: DISCONTINUED | OUTPATIENT
Start: 2017-12-10 | End: 2017-12-10

## 2017-12-10 RX ORDER — POTASSIUM CHLORIDE 20 MEQ
20 PACKET (EA) ORAL
Qty: 0 | Refills: 0 | Status: COMPLETED | OUTPATIENT
Start: 2017-12-10 | End: 2017-12-10

## 2017-12-10 RX ADMIN — Medication 325 MILLIGRAM(S): at 09:57

## 2017-12-10 RX ADMIN — Medication 100 MILLIEQUIVALENT(S): at 00:12

## 2017-12-10 RX ADMIN — CLOPIDOGREL BISULFATE 75 MILLIGRAM(S): 75 TABLET, FILM COATED ORAL at 12:39

## 2017-12-10 RX ADMIN — HEPARIN SODIUM 5000 UNIT(S): 5000 INJECTION INTRAVENOUS; SUBCUTANEOUS at 05:23

## 2017-12-10 RX ADMIN — CELECOXIB 200 MILLIGRAM(S): 200 CAPSULE ORAL at 12:38

## 2017-12-10 RX ADMIN — Medication 20 MILLIGRAM(S): at 12:39

## 2017-12-10 RX ADMIN — Medication 325 MILLIGRAM(S): at 10:30

## 2017-12-10 RX ADMIN — SODIUM CHLORIDE 150 MILLILITER(S): 9 INJECTION INTRAMUSCULAR; INTRAVENOUS; SUBCUTANEOUS at 09:56

## 2017-12-10 RX ADMIN — Medication 20 MILLIEQUIVALENT(S): at 12:38

## 2017-12-10 RX ADMIN — Medication 20 MILLIEQUIVALENT(S): at 07:14

## 2017-12-10 RX ADMIN — PANTOPRAZOLE SODIUM 40 MILLIGRAM(S): 20 TABLET, DELAYED RELEASE ORAL at 12:39

## 2017-12-10 NOTE — DISCHARGE NOTE ADULT - PLAN OF CARE
follow up follow up in the office with Dr. Antonette Hutchins. Call to schedule an appointment this friday 12/15. call sooner if anything changes. resume all home meds including plavix. patient will likely require outpatient endoscopy.

## 2017-12-10 NOTE — DISCHARGE NOTE ADULT - MEDICATION SUMMARY - MEDICATIONS TO TAKE
I will START or STAY ON the medications listed below when I get home from the hospital:    acetaminophen 325 mg oral tablet  -- 1 tab(s) by mouth every 4 hours, As needed, Mild Pain (1 - 3)  -- Indication: For PRN pain    celecoxib 200 mg oral capsule  -- 1 cap(s) by mouth once a day  -- Indication: For Home med     Plavix 75 mg oral tablet  -- 1 tab(s) by mouth once a day  -- Indication: For Home med     metoprolol succinate 25 mg oral tablet, extended release  -- 1 tab(s) by mouth once a day  -- Indication: For Home med     Lasix 20 mg oral tablet  -- 1 tab(s) by mouth once a day  -- Indication: For Home med     omeprazole 20 mg oral delayed release capsule  -- 1 cap(s) by mouth once a day  -- Indication: For Home med

## 2017-12-10 NOTE — PROGRESS NOTE ADULT - ASSESSMENT
76 YO F h/o HTN, s/p b/l endarterectomy (1994, 1995) c/b CVA with no residual deficits, s/p RYGB with cholecystectomy (1981), c/p craniotomy 2/2 brain aneurysm x2 (both in 1992), c/p left lobectomy for lung CA (2016), CAD s/p cardiac stent placement (2004)   p/w 4-5 episodes of NBNB vomiting and generalized weakness.    # Elevated transaminases 2/2 microlithiasis vs GB sludge vs cholestasis from viral illness  - LFTs downtrending  - RUQ U/S shows a dilated CBD without intrahepatic ductal dilatation; which may be d/t pts h/o of cholecystectomy and gastric bypass  - No indication for urgent GI intervention  - Discussed with patient results of the above studies and she is in agreement with discharge and follow-up with a GI in [place]    Case d/w Dr. Willis  GI will follow

## 2017-12-10 NOTE — PROGRESS NOTE ADULT - SUBJECTIVE AND OBJECTIVE BOX
Pt seen and examined at bedside. ANABEL overnight. Reports headache, but denies abdominal pain, nausea, vomiting, melena, or hematochezia.     Allergies  ciprofloxacin (Hives)  codeine (Rash)    MEDICATIONS:  MEDICATIONS  (STANDING):  celecoxib 200 milliGRAM(s) Oral daily  clopidogrel Tablet 75 milliGRAM(s) Oral daily  furosemide    Tablet 20 milliGRAM(s) Oral daily  heparin  Injectable 5000 Unit(s) SubCutaneous every 12 hours  pantoprazole    Tablet 40 milliGRAM(s) Oral before breakfast  potassium chloride    Tablet ER 20 milliEquivalent(s) Oral every 2 hours  sodium chloride 0.9%. 1000 milliLiter(s) (150 mL/Hr) IV Continuous <Continuous>    MEDICATIONS  (PRN):  acetaminophen   Tablet. 325 milliGRAM(s) Oral every 4 hours PRN Mild Pain (1 - 3)  oxyCODONE    5 mG/acetaminophen 325 mG 1 Tablet(s) Oral every 4 hours PRN Moderate Pain (4 - 6)    Vital Signs Last 24 Hrs  T(C): 37.5 (10 Dec 2017 09:14), Max: 37.5 (10 Dec 2017 09:14)  T(F): 99.5 (10 Dec 2017 09:14), Max: 99.5 (10 Dec 2017 09:14)  HR: 75 (10 Dec 2017 09:14) (62 - 85)  BP: 132/57 (10 Dec 2017 09:14) (116/67 - 138/68)  BP(mean): --  RR: 18 (10 Dec 2017 09:14) (16 - 18)  SpO2: 95% (10 Dec 2017 09:14) (95% - 95%)    12-09 @ 07:01  -  12-10 @ 07:00  --------------------------------------------------------  IN: 1800 mL / OUT: 0 mL / NET: 1800 mL      PHYSICAL EXAM:    General: Well developed; well nourished; in no acute distress  HEENT: MMM, conjunctiva and sclera clear  Gastrointestinal: Soft non-tender non-distended;  No rebound or guarding  Skin: Warm and dry. No obvious rash    LABS:                        10.5   5.7   )-----------( 209      ( 10 Dec 2017 05:29 )             31.2     12-10    141  |  109<H>  |  21  ----------------------------<  98  3.7   |  21<L>  |  0.97    Ca    8.1<L>      10 Dec 2017 05:29  Phos  3.0     12-10  Mg     1.4     12-10    TPro  5.5<L>  /  Alb  3.0<L>  /  TBili  0.5  /  DBili  x   /  AST  37  /  ALT  60<H>  /  AlkPhos  113  12-10    PT/INR - ( 09 Dec 2017 11:20 )   PT: 11.2 sec;   INR: 1.01       PTT - ( 09 Dec 2017 11:20 )  PTT:32.3 sec    RADIOLOGY & ADDITIONAL STUDIES:  US Abdomen Limited (12.09.17 @ 13:45)  FINDINGS: Limited study due to overlying gas. Ultrasound evaluation of   the right upper quadrant demonstrates no focal hepatic abnormalities. The   liver measures 14.5 cm . The liver parenchyma echogenicity is increased.    Patient status post cholecystectomy with CBD measuring 1.6 cm .   The   visualized portions of the pancreas are unremarkable.   The visualized   portions of the abdominal aortaand inferior vena cava are normal in   appearance.   The right kidney is 10.6 cm in length and normal in   appearance. No ascites is seen.     IMPRESSION:  Moderate hepatic steatosis.   Status post cholecystectomy.   Dilated CBD measuring 1.6 cm. No evidence of intrahepatic bile duct   dilatation however please note the study is quite limited due to   extensive bowel gas

## 2017-12-10 NOTE — DISCHARGE NOTE ADULT - CARE PROVIDER_API CALL
Antonette Hutchins), Surgery; Surgical Oncology  [address]  [address] [place]  [place] [address]  Phone: [phone]  Fax: [phone]

## 2017-12-10 NOTE — PROGRESS NOTE ADULT - SUBJECTIVE AND OBJECTIVE BOX
12/9 LFTs trending down, CT Abd showed dilatation of CBD (1.7 cm) and intrahepatic duct, unable to obtain MRCP bc of aneurysmal clips          76 yo F PMH of HTN, stroke (1992) and PSx of gastric bypass (1981), cholecystectomy (1981), craniotomy 2/2 brain aneurysm x2 (both in 1992), b/l endarterectomy (1994, 1995), excision of lung CA from left lung (2016), cardiac stent placement (2004), presenting with emesis and weakness w/o syncope or LOC.   .   -CT Abd showed dilatation of CBD (1.7 cm)   -CLL, IV fluid  [ ] f/u Abd U/S   [ ] GI consult [facility]: unable to perform ERCP bc of her hx of gastric bypass, possible EUS Monday (if concerned for biliary obstruction   [ ] if n/a intervention, restart plavix? 12/9 LFTs trending down, CT Abd showed dilatation of CBD (1.7 cm) and intrahepatic duct, unable to obtain MRCP bc of aneurysmal clips    Vital Signs Last 24 Hrs  T(C): 36.8 (10 Dec 2017 05:13), Max: 37 (09 Dec 2017 10:51)  T(F): 98.3 (10 Dec 2017 05:13), Max: 98.6 (09 Dec 2017 10:51)  HR: 85 (10 Dec 2017 05:13) (62 - 85)  BP: 130/76 (10 Dec 2017 05:13) (115/68 - 138/68)  BP(mean): --  RR: 17 (10 Dec 2017 05:13) (16 - 18)  SpO2: 95% (10 Dec 2017 05:13) (95% - 99%)    I&O's Summary    09 Dec 2017 07:01  -  10 Dec 2017 07:00  --------------------------------------------------------  IN: 1800 mL / OUT: 0 mL / NET: 1800 mL      Gen: NAD   Abd: soft, nt / nd       78 yo F PMH of HTN, stroke (1992) and PSx of gastric bypass (1981), cholecystectomy (1981), craniotomy 2/2 brain aneurysm x2 (both in 1992), b/l endarterectomy (1994, 1995), excision of lung CA from left lung (2016), cardiac stent placement (2004), presenting with emesis and weakness w/o syncope or LOC.   .   -CT Abd showed dilatation of CBD (1.7 cm)   -CLL, IV fluid  [ ] f/u Abd U/S   [ ] GI consult Zlatanic: unable to perform ERCP bc of her hx of gastric bypass, possible EUS Monday (if concerned for biliary obstruction   [ ] if n/a intervention, restart plavix?

## 2017-12-10 NOTE — DISCHARGE NOTE ADULT - PATIENT PORTAL LINK FT
“You can access the [facility] Patient Portal, offered by [facility], by registering with the following website: [email or web address]”

## 2017-12-10 NOTE — DISCHARGE NOTE ADULT - HOSPITAL COURSE
Ms. Fowler is a 77 year old F with a PMH of HTN, stroke (1992) and PSx of gastric bypass (1981), cholecystectomy (1981), craniotomy 2/2 brain aneurysm x2 (both in 1992), b/l endarterectomy (1994, 1995), excision of lung CA from left lung (2016), cardiac stent placement (2004), presenting with emesis and weakness w/o syncope or LOC. On admission, she presented with elevated LFTs and lipase. No leukocytosis. She was afebrile. CT scan showed CBD dilation to 1.7 cm along with intrahepatic biliary duct dilation. Patient unable to get MRCP due to clip. GI unable to perform ERCP due to history of bypass. LFTs trended down. Patient tolerated a regular diet.   Patient was sent home in stable condition with instructions to follow up in the office with Dr. Hutchins. Additionally patient will likely require outpatient endoscopy to further evaluate CBD.

## 2017-12-10 NOTE — DISCHARGE NOTE ADULT - CARE PLAN
Principal Discharge DX:	Biliary obstruction  Goal:	follow up  Instructions for follow-up, activity and diet:	follow up in the office with Dr. Antonette Hutchins. Call to schedule an appointment this friday 12/15. call sooner if anything changes. resume all home meds including plavix. patient will likely require outpatient endoscopy.

## 2017-12-13 DIAGNOSIS — K83.1 OBSTRUCTION OF BILE DUCT: ICD-10-CM

## 2017-12-13 DIAGNOSIS — Z85.118 PERSONAL HISTORY OF OTHER MALIGNANT NEOPLASM OF BRONCHUS AND LUNG: ICD-10-CM

## 2017-12-13 DIAGNOSIS — I10 ESSENTIAL (PRIMARY) HYPERTENSION: ICD-10-CM

## 2017-12-13 DIAGNOSIS — Z98.84 BARIATRIC SURGERY STATUS: ICD-10-CM

## 2017-12-13 DIAGNOSIS — Z95.5 PRESENCE OF CORONARY ANGIOPLASTY IMPLANT AND GRAFT: ICD-10-CM

## 2017-12-13 DIAGNOSIS — Z86.73 PERSONAL HISTORY OF TRANSIENT ISCHEMIC ATTACK (TIA), AND CEREBRAL INFARCTION WITHOUT RESIDUAL DEFICITS: ICD-10-CM

## 2017-12-13 DIAGNOSIS — Z90.49 ACQUIRED ABSENCE OF OTHER SPECIFIED PARTS OF DIGESTIVE TRACT: ICD-10-CM

## 2017-12-19 PROCEDURE — 85610 PROTHROMBIN TIME: CPT

## 2017-12-19 PROCEDURE — 74177 CT ABD & PELVIS W/CONTRAST: CPT

## 2017-12-19 PROCEDURE — 76705 ECHO EXAM OF ABDOMEN: CPT

## 2017-12-19 PROCEDURE — 85730 THROMBOPLASTIN TIME PARTIAL: CPT

## 2017-12-19 PROCEDURE — 83690 ASSAY OF LIPASE: CPT

## 2017-12-19 PROCEDURE — 99285 EMERGENCY DEPT VISIT HI MDM: CPT | Mod: 25

## 2017-12-19 PROCEDURE — 85027 COMPLETE CBC AUTOMATED: CPT

## 2017-12-19 PROCEDURE — 86901 BLOOD TYPING SEROLOGIC RH(D): CPT

## 2017-12-19 PROCEDURE — 85025 COMPLETE CBC W/AUTO DIFF WBC: CPT

## 2017-12-19 PROCEDURE — 96375 TX/PRO/DX INJ NEW DRUG ADDON: CPT

## 2017-12-19 PROCEDURE — 36415 COLL VENOUS BLD VENIPUNCTURE: CPT

## 2017-12-19 PROCEDURE — 83735 ASSAY OF MAGNESIUM: CPT

## 2017-12-19 PROCEDURE — 93005 ELECTROCARDIOGRAM TRACING: CPT

## 2017-12-19 PROCEDURE — 84100 ASSAY OF PHOSPHORUS: CPT

## 2017-12-19 PROCEDURE — 71010: CPT

## 2017-12-19 PROCEDURE — 86900 BLOOD TYPING SEROLOGIC ABO: CPT

## 2017-12-19 PROCEDURE — 86850 RBC ANTIBODY SCREEN: CPT

## 2017-12-19 PROCEDURE — 80053 COMPREHEN METABOLIC PANEL: CPT

## 2017-12-19 PROCEDURE — 96374 THER/PROPH/DIAG INJ IV PUSH: CPT | Mod: XU

## 2018-02-22 ENCOUNTER — OFFICE VISIT (OUTPATIENT)
Dept: SURGERY | Age: 78
End: 2018-02-22

## 2018-02-22 VITALS
SYSTOLIC BLOOD PRESSURE: 140 MMHG | OXYGEN SATURATION: 98 % | DIASTOLIC BLOOD PRESSURE: 84 MMHG | HEART RATE: 61 BPM | TEMPERATURE: 98.6 F | BODY MASS INDEX: 29.88 KG/M2 | WEIGHT: 175 LBS | RESPIRATION RATE: 20 BRPM | HEIGHT: 64 IN

## 2018-02-22 DIAGNOSIS — K21.9 GASTROESOPHAGEAL REFLUX DISEASE WITHOUT ESOPHAGITIS: Primary | ICD-10-CM

## 2018-02-22 RX ORDER — HYDROCHLOROTHIAZIDE 50 MG/1
25 TABLET ORAL DAILY
COMMUNITY

## 2018-02-22 RX ORDER — CELECOXIB 200 MG/1
CAPSULE ORAL 2 TIMES DAILY
COMMUNITY

## 2018-02-23 NOTE — PATIENT INSTRUCTIONS

## 2018-02-25 NOTE — PROGRESS NOTES
Surgery Consult    Subjective:      Milly Valentin is a 70 y.o. female who is being seen for evaluation of GERD symptoms. She has a history of gastric bypass at [facility] in 1983 with good weight loss results. She has developed GERD symptoms with recent episode of severe nausea/vomiting/dehydration while in [place] requiring admission, resuscitation. Recent upper endoscopy reveals erosion of a foreign body in her upper stomach. She denies abdominal pain, hematemesis, bloody bowel movements, chest pain or wheezing. Patient Active Problem List    Diagnosis Date Noted    Gastroesophageal reflux disease without esophagitis 02/22/2018    Chest pain 04/23/2015    Heart failure ([address] 04/23/2015    High cholesterol 04/23/2015    CAD (coronary artery disease) 04/23/2015    HTN (hypertension) 04/23/2015    Bradycardia 04/23/2015    Depression 04/23/2015     Past Medical History:   Diagnosis Date    Aneurysm, cerebral     bilat    Heart failure ([address]     High cholesterol     Meningitis     Pancreatic stones       Past Surgical History:   Procedure Laterality Date    HX CAROTID ENDARTERECTOMY      bilat    HX HEART CATHETERIZATION      stent Lcflx 2002    HX OTHER SURGICAL  5-    Nuclear stress negative for ischemia, EF 70%    HX OTHER SURGICAL  3.2002    EF 60% PaSP 52 mmhg, mild MR       Social History   Substance Use Topics    Smoking status: Former Smoker     Quit date: 4/23/1993    Smokeless tobacco: Never Used    Alcohol use Yes      Comment: 6-7 drinks per week      Family History   Problem Relation Age of Onset    Heart Disease Father     Stroke Sister     Cancer Sister       Current Outpatient Prescriptions   Medication Sig    hydroCHLOROthiazide (HYDRODIURIL) 50 mg tablet Take 25 mg by mouth daily.  METOPROLOL TARTRATE PO Take  by mouth.  celecoxib (CELEBREX) 200 mg capsule Take  by mouth two (2) times a day.  amLODIPine (NORVASC) 5 mg tablet Take 1 Tab by mouth daily.     atorvastatin (LIPITOR) 20 mg tablet Take 1 Tab by mouth nightly.  clopidogrel (PLAVIX) 75 mg tablet Take 75 mg by mouth daily. DOSAGE UNKNOWN    omeprazole (PRILOSEC) 20 mg capsule Take 20 mg by mouth Daily (before breakfast).  escitalopram oxalate (LEXAPRO) 10 mg tablet Take 10 mg by mouth daily.  aspirin delayed-release 81 mg tablet Take 81 mg by mouth daily.  ezetimibe (ZETIA) 10 mg tablet Take 10 mg by mouth daily. No current facility-administered medications for this visit. Allergies   Allergen Reactions    Ciprofloxacin Anaphylaxis    Codeine Rash       Review of Systems:    A complete review of systems was negative except as noted in the HPI. Objective:        Visit Vitals    /84    Pulse 61    Temp 98.6 °F (37 °C)    Resp 20    Ht 5' 4\" (1.626 m)    Wt 175 lb (79.4 kg)    SpO2 98%    BMI 30.04 kg/m2       Physical Exam:  GENERAL: alert, cooperative, no distress, appears stated age, mildly obese, EYE: negative, LYMPH NODES: Cervical, supraclavicular nodes normal. THROAT & NECK: normal, LUNG: clear to auscultation bilaterally, HEART: regular rate and rhythm, S1, S2 normal, no murmur. ABDOMEN: NABS, non-distended, soft; well healed scars. No pain with palpation, mass or hernia. EXTREMITIES:  extremities normal, atraumatic, no cyanosis or edema, SKIN: Normal., NEUROLOGIC: negative. Lab/Data Review:  Endoscopy report reviewed. Assessment:     GERD symptoms after bariatric surgery. Given time procedure was performed, she likely underwent vertical banded gastroplasty, now with signs of band erosion. Plan:     1. I recommend proceeding with UGI series to delineate anatomy. 2. GERD Rx.  3. Follow-up after above.        Signed By: Yina Rubio MD     February 25, 2018

## 2018-03-01 ENCOUNTER — HOSPITAL ENCOUNTER (OUTPATIENT)
Dept: GENERAL RADIOLOGY | Age: 78
Discharge: HOME | End: 2018-03-01
Attending: SURGERY
Payer: MEDICARE

## 2018-03-01 DIAGNOSIS — K21.9 GASTROESOPHAGEAL REFLUX DISEASE WITHOUT ESOPHAGITIS: ICD-10-CM

## 2018-03-01 PROCEDURE — 74241 XR UPPER GI SERIES W KUB: CPT

## 2018-04-16 ENCOUNTER — TELEPHONE (OUTPATIENT)
Dept: SURGERY | Age: 78
End: 2018-04-16

## 2018-04-16 NOTE — TELEPHONE ENCOUNTER
I called the patient and I let her know that Dr Christal Sevilla was over in the hospital but I will forward this message to his, she said she is going out of town for a few days and Dr Christal Sevilla can leave the results with her .

## 2019-03-18 NOTE — ED ADULT NURSE NOTE - NS ED NURSE LEVEL OF CONSCIOUSNESS MENTAL STATUS
Alert/Awake/Cooperative CHF Week 2 Survey      Responses   Facility patient discharged from?  [place]   Does the patient have one of the following disease processes/diagnoses(primary or secondary)?  CHF   Week 2 attempt successful?  Yes   Call start time  1402   Call end time  1404   Discharge diagnosis  Acute exac. of CHF, essential HTN   Meds reviewed with patient/caregiver?  Yes   Is the patient taking all medications as directed (includes completed medication regime)?  Yes   Has the patient kept scheduled appointments due by today?  Yes   What is the patient's perception of their health status since discharge?  Improving   Is the patient weighing daily?  Yes   Is the patient able to teach back Heart Failure Zones?  Yes   CHF Week 2 call completed?  Yes          Argelia Vivas, RN

## 2021-11-17 ENCOUNTER — HOSPITAL ENCOUNTER (EMERGENCY)
Age: 81
Discharge: HOME | End: 2021-11-17
Attending: EMERGENCY MEDICINE
Payer: MEDICARE

## 2021-11-17 ENCOUNTER — APPOINTMENT (OUTPATIENT)
Dept: GENERAL RADIOLOGY | Age: 81
End: 2021-11-17
Attending: PHYSICIAN ASSISTANT
Payer: MEDICARE

## 2021-11-17 VITALS
RESPIRATION RATE: 16 BRPM | DIASTOLIC BLOOD PRESSURE: 70 MMHG | OXYGEN SATURATION: 98 % | SYSTOLIC BLOOD PRESSURE: 160 MMHG | HEIGHT: 64 IN | TEMPERATURE: 96.9 F | WEIGHT: 168 LBS | BODY MASS INDEX: 28.68 KG/M2 | HEART RATE: 60 BPM

## 2021-11-17 DIAGNOSIS — M10.9 ACUTE GOUT INVOLVING TOE OF LEFT FOOT, UNSPECIFIED CAUSE: Primary | ICD-10-CM

## 2021-11-17 LAB
ANION GAP SERPL CALC-SCNC: 5 MMOL/L (ref 5–15)
BASOPHILS # BLD: 0 K/UL (ref 0–0.1)
BASOPHILS NFR BLD: 0 % (ref 0–1)
BUN SERPL-MCNC: 40 MG/DL (ref 6–20)
BUN/CREAT SERPL: 31 (ref 12–20)
CALCIUM SERPL-MCNC: 8.6 MG/DL (ref 8.5–10.1)
CHLORIDE SERPL-SCNC: 109 MMOL/L (ref 97–108)
CO2 SERPL-SCNC: 27 MMOL/L (ref 21–32)
COMMENT, HOLDF: NORMAL
CREAT SERPL-MCNC: 1.31 MG/DL (ref 0.55–1.02)
DIFFERENTIAL METHOD BLD: ABNORMAL
EOSINOPHIL # BLD: 0.1 K/UL (ref 0–0.4)
EOSINOPHIL NFR BLD: 1 % (ref 0–7)
ERYTHROCYTE [DISTWIDTH] IN BLOOD BY AUTOMATED COUNT: 13.1 % (ref 11.5–14.5)
GLUCOSE SERPL-MCNC: 120 MG/DL (ref 65–100)
HCT VFR BLD AUTO: 38.4 % (ref 35–47)
HGB BLD-MCNC: 12.4 G/DL (ref 11.5–16)
IMM GRANULOCYTES # BLD AUTO: 0.1 K/UL (ref 0–0.04)
IMM GRANULOCYTES NFR BLD AUTO: 1 % (ref 0–0.5)
LYMPHOCYTES # BLD: 1.5 K/UL (ref 0.8–3.5)
LYMPHOCYTES NFR BLD: 18 % (ref 12–49)
MCH RBC QN AUTO: 31.6 PG (ref 26–34)
MCHC RBC AUTO-ENTMCNC: 32.3 G/DL (ref 30–36.5)
MCV RBC AUTO: 97.7 FL (ref 80–99)
MONOCYTES # BLD: 0.8 K/UL (ref 0–1)
MONOCYTES NFR BLD: 9 % (ref 5–13)
NEUTS SEG # BLD: 6 K/UL (ref 1.8–8)
NEUTS SEG NFR BLD: 71 % (ref 32–75)
NRBC # BLD: 0 K/UL (ref 0–0.01)
NRBC BLD-RTO: 0 PER 100 WBC
PLATELET # BLD AUTO: 458 K/UL (ref 150–400)
PMV BLD AUTO: 10 FL (ref 8.9–12.9)
POTASSIUM SERPL-SCNC: 4.5 MMOL/L (ref 3.5–5.1)
RBC # BLD AUTO: 3.93 M/UL (ref 3.8–5.2)
SAMPLES BEING HELD,HOLD: NORMAL
SODIUM SERPL-SCNC: 141 MMOL/L (ref 136–145)
WBC # BLD AUTO: 8.5 K/UL (ref 3.6–11)

## 2021-11-17 PROCEDURE — 99281 EMR DPT VST MAYX REQ PHY/QHP: CPT

## 2021-11-17 PROCEDURE — 80048 BASIC METABOLIC PNL TOTAL CA: CPT

## 2021-11-17 PROCEDURE — 36415 COLL VENOUS BLD VENIPUNCTURE: CPT

## 2021-11-17 PROCEDURE — 73630 X-RAY EXAM OF FOOT: CPT

## 2021-11-17 PROCEDURE — 85025 COMPLETE CBC W/AUTO DIFF WBC: CPT

## 2021-11-17 RX ORDER — ONDANSETRON 4 MG/1
4 TABLET, ORALLY DISINTEGRATING ORAL
Qty: 20 TABLET | Refills: 0 | Status: SHIPPED | OUTPATIENT
Start: 2021-11-17

## 2021-11-17 RX ORDER — PREDNISONE 20 MG/1
60 TABLET ORAL DAILY
Qty: 15 TABLET | Refills: 0 | Status: SHIPPED | OUTPATIENT
Start: 2021-11-17 | End: 2021-11-22

## 2021-11-17 RX ORDER — HYDROCODONE BITARTRATE AND ACETAMINOPHEN 5; 325 MG/1; MG/1
1 TABLET ORAL
Qty: 20 TABLET | Refills: 0 | Status: SHIPPED | OUTPATIENT
Start: 2021-11-17 | End: 2021-11-22

## 2021-11-17 NOTE — ED TRIAGE NOTES
Patient reports pain to the toe on the left foot for about 7 days. Patient completed steroid medication for 7 days. The redness and pain did not get better. Patient is ambulatory in Triage.

## 2021-11-17 NOTE — ED PROVIDER NOTES
79 y/o female presenting with complaint of left foot pain. The patient states that she has had left toe pain and swelling for the past week. She recalls dropping something on her foot while cooking recently but is unsure whether that was before or after the onset of her pain. She was seen at [facility] 1 week ago, was diagnosed with gout and was prescribed a Medrol Dosepak, however she has finished this without any improvement. Her pain is 7/10, nonradiating, somewhat relieved by Tylenol. No fevers, other joint pain, weakness, numbness or open wounds. The history is provided by the patient.         Past Medical History:   Diagnosis Date    Aneurysm, cerebral     bilat    Heart failure (HCC)     High cholesterol     Meningitis     Pancreatic stones        Past Surgical History:   Procedure Laterality Date    HX CAROTID ENDARTERECTOMY      bilat    HX HEART CATHETERIZATION      stent Lcflx     HX OTHER SURGICAL  2013    Nuclear stress negative for ischemia, EF 70%    HX OTHER SURGICAL  3.2002    EF 60% PaSP 52 mmhg, mild MR          Family History:   Problem Relation Age of Onset    Heart Disease Father     Stroke Sister     Cancer Sister        Social History     Socioeconomic History    Marital status:      Spouse name: Not on file    Number of children: Not on file    Years of education: Not on file    Highest education level: Not on file   Occupational History    Not on file   Tobacco Use    Smoking status: Former Smoker     Quit date: 1993     Years since quittin.5    Smokeless tobacco: Never Used   Substance and Sexual Activity    Alcohol use: Yes     Comment: 6-7 drinks per week    Drug use: No    Sexual activity: Not on file   Other Topics Concern    Not on file   Social History Narrative    Not on file     Social Determinants of Health     Financial Resource Strain:     Difficulty of Paying Living Expenses: Not on file   Food Insecurity:     Worried About Running Out of Food in the Last Year: Not on file    Ran Out of Food in the Last Year: Not on file   Transportation Needs:     Lack of Transportation (Medical): Not on file    Lack of Transportation (Non-Medical): Not on file   Physical Activity:     Days of Exercise per Week: Not on file    Minutes of Exercise per Session: Not on file   Stress:     Feeling of Stress : Not on file   Social Connections:     Frequency of Communication with Friends and Family: Not on file    Frequency of Social Gatherings with Friends and Family: Not on file    Attends [employer] Services: Not on file    Active Member of [address] [place] [address] [employer] or Organizations: Not on file    Attends Club or Organization Meetings: Not on file    Marital Status: Not on file   Intimate Partner Violence:     Fear of Current or Ex-Partner: Not on file    Emotionally Abused: Not on file    Physically Abused: Not on file    Sexually Abused: Not on file   Housing Stability:     Unable to Pay for Housing in the Last Year: Not on file    Number of [place] in the Last Year: Not on file    Unstable Housing in the Last Year: Not on file         ALLERGIES: Ciprofloxacin and Codeine    Review of Systems   Constitutional: Negative for chills and fever. HENT: Negative for congestion. Respiratory: Positive for cough. Gastrointestinal: Negative for diarrhea and vomiting. Musculoskeletal: Positive for arthralgias (left foot pain). Negative for myalgias. Skin: Negative for wound. Neurological: Negative for weakness and numbness. Vitals:    11/17/21 1505   BP: (!) 160/70   Pulse: 60   Resp: 16   Temp: 96.9 °F (36.1 °C)   SpO2: 98%   Weight: 76.2 kg (168 lb)   Height: 5' 4\" (1.626 m)            Physical Exam  Vitals and nursing note reviewed. Constitutional:       General: She is not in acute distress. Appearance: She is well-developed. She is not diaphoretic. HENT:      Head: Normocephalic and atraumatic.    Eyes: Conjunctiva/sclera: Conjunctivae normal.   Cardiovascular:      Rate and Rhythm: Normal rate. Pulses:           Dorsalis pedis pulses are 2+ on the left side. Pulmonary:      Effort: Pulmonary effort is normal. No respiratory distress. Musculoskeletal:      Cervical back: Normal range of motion and neck supple. Comments: Left foot with swelling, erythema and tenderness to palpation around first MTP. No tenderness of toes, lateral or proximal foot, or ankle. Skin:     General: Skin is warm and dry. Neurological:      Mental Status: She is alert and oriented to person, place, and time. MDM       Procedures  Presentation, management, and disposition were discussed with the attending physician, Dr. Cj Espinal, who is in agreement with plan of care. 81 y/o female presenting with complaint of left foot pain. The patient is well-appearing, afebrile, not tachycardic or hypotensive. XR left foot, read by radiology and independently visualized and interpreted by myself, reveals evidence of 1st MTP gout, no evidence of acute fractures or traumatic malalignment. Labs reveal renal dysfunction but no leukocytosis. Recommended oral hydration and prompt PCP follow up to repeat BMP. Rx for prednisone 60 mg x5 days, Norco as needed for pain. Strict ED return precautions discussed and provided in writing at time of discharge. The patient verbalized understanding and agreement with this plan.

## 2022-03-19 PROBLEM — K21.9 GASTROESOPHAGEAL REFLUX DISEASE WITHOUT ESOPHAGITIS: Status: ACTIVE | Noted: 2018-02-22

## 2023-01-10 ENCOUNTER — HOSPITAL ENCOUNTER (EMERGENCY)
Age: 83
Discharge: HOME | End: 2023-01-10
Attending: STUDENT IN AN ORGANIZED HEALTH CARE EDUCATION/TRAINING PROGRAM
Payer: MEDICARE

## 2023-01-10 VITALS
WEIGHT: 172 LBS | HEIGHT: 64 IN | TEMPERATURE: 98.1 F | DIASTOLIC BLOOD PRESSURE: 72 MMHG | OXYGEN SATURATION: 96 % | SYSTOLIC BLOOD PRESSURE: 162 MMHG | BODY MASS INDEX: 29.37 KG/M2 | HEART RATE: 54 BPM | RESPIRATION RATE: 16 BRPM

## 2023-01-10 DIAGNOSIS — R00.2 PALPITATIONS: Primary | ICD-10-CM

## 2023-01-10 DIAGNOSIS — N17.9 AKI (ACUTE KIDNEY INJURY) (HCC): ICD-10-CM

## 2023-01-10 LAB
ALBUMIN SERPL-MCNC: 3.7 G/DL (ref 3.5–5)
ALBUMIN/GLOB SERPL: 1 (ref 1.1–2.2)
ALP SERPL-CCNC: 82 U/L (ref 45–117)
ALT SERPL-CCNC: 20 U/L (ref 12–78)
ANION GAP SERPL CALC-SCNC: 6 MMOL/L (ref 5–15)
AST SERPL-CCNC: 12 U/L (ref 15–37)
BASOPHILS # BLD: 0 K/UL (ref 0–0.1)
BASOPHILS NFR BLD: 0 % (ref 0–1)
BILIRUB SERPL-MCNC: 0.4 MG/DL (ref 0.2–1)
BUN SERPL-MCNC: 54 MG/DL (ref 6–20)
BUN/CREAT SERPL: 34 (ref 12–20)
CALCIUM SERPL-MCNC: 8.9 MG/DL (ref 8.5–10.1)
CHLORIDE SERPL-SCNC: 109 MMOL/L (ref 97–108)
CO2 SERPL-SCNC: 27 MMOL/L (ref 21–32)
COMMENT, HOLDF: NORMAL
CREAT SERPL-MCNC: 1.61 MG/DL (ref 0.55–1.02)
DIFFERENTIAL METHOD BLD: ABNORMAL
EOSINOPHIL # BLD: 0.7 K/UL (ref 0–0.4)
EOSINOPHIL NFR BLD: 10 % (ref 0–7)
ERYTHROCYTE [DISTWIDTH] IN BLOOD BY AUTOMATED COUNT: 13 % (ref 11.5–14.5)
GLOBULIN SER CALC-MCNC: 3.7 G/DL (ref 2–4)
GLUCOSE SERPL-MCNC: 125 MG/DL (ref 65–100)
HCT VFR BLD AUTO: 36.9 % (ref 35–47)
HGB BLD-MCNC: 12.1 G/DL (ref 11.5–16)
IMM GRANULOCYTES # BLD AUTO: 0.1 K/UL (ref 0–0.04)
IMM GRANULOCYTES NFR BLD AUTO: 1 % (ref 0–0.5)
LYMPHOCYTES # BLD: 1.8 K/UL (ref 0.8–3.5)
LYMPHOCYTES NFR BLD: 25 % (ref 12–49)
MAGNESIUM SERPL-MCNC: 1.9 MG/DL (ref 1.6–2.4)
MCH RBC QN AUTO: 32.1 PG (ref 26–34)
MCHC RBC AUTO-ENTMCNC: 32.8 G/DL (ref 30–36.5)
MCV RBC AUTO: 97.9 FL (ref 80–99)
MONOCYTES # BLD: 0.7 K/UL (ref 0–1)
MONOCYTES NFR BLD: 9 % (ref 5–13)
NEUTS SEG # BLD: 4 K/UL (ref 1.8–8)
NEUTS SEG NFR BLD: 55 % (ref 32–75)
NRBC # BLD: 0 K/UL (ref 0–0.01)
NRBC BLD-RTO: 0 PER 100 WBC
PLATELET # BLD AUTO: 331 K/UL (ref 150–400)
PMV BLD AUTO: 10.3 FL (ref 8.9–12.9)
POTASSIUM SERPL-SCNC: 4 MMOL/L (ref 3.5–5.1)
PROT SERPL-MCNC: 7.4 G/DL (ref 6.4–8.2)
RBC # BLD AUTO: 3.77 M/UL (ref 3.8–5.2)
SAMPLES BEING HELD,HOLD: NORMAL
SODIUM SERPL-SCNC: 142 MMOL/L (ref 136–145)
TROPONIN-HIGH SENSITIVITY: 8 NG/L (ref 0–51)
TSH SERPL DL<=0.05 MIU/L-ACNC: 0.94 UIU/ML (ref 0.36–3.74)
WBC # BLD AUTO: 7.3 K/UL (ref 3.6–11)

## 2023-01-10 PROCEDURE — 99284 EMERGENCY DEPT VISIT MOD MDM: CPT

## 2023-01-10 PROCEDURE — 85025 COMPLETE CBC W/AUTO DIFF WBC: CPT

## 2023-01-10 PROCEDURE — 84484 ASSAY OF TROPONIN QUANT: CPT

## 2023-01-10 PROCEDURE — 80053 COMPREHEN METABOLIC PANEL: CPT

## 2023-01-10 PROCEDURE — 83735 ASSAY OF MAGNESIUM: CPT

## 2023-01-10 PROCEDURE — 36415 COLL VENOUS BLD VENIPUNCTURE: CPT

## 2023-01-10 PROCEDURE — 84443 ASSAY THYROID STIM HORMONE: CPT

## 2023-01-10 PROCEDURE — 93005 ELECTROCARDIOGRAM TRACING: CPT

## 2023-01-11 LAB
ATRIAL RATE: 48 BPM
CALCULATED P AXIS, ECG09: 95 DEGREES
CALCULATED R AXIS, ECG10: -10 DEGREES
CALCULATED T AXIS, ECG11: 14 DEGREES
DIAGNOSIS, 93000: NORMAL
P-R INTERVAL, ECG05: 182 MS
Q-T INTERVAL, ECG07: 450 MS
QRS DURATION, ECG06: 86 MS
QTC CALCULATION (BEZET), ECG08: 402 MS
VENTRICULAR RATE, ECG03: 48 BPM

## 2023-01-11 NOTE — ED PROVIDER NOTES
Ken Morley is a [de-identified] y.o. female with past medical history notable for bilateral skin peripheral aneurysms coiled in  and shortly thereafter, heart failure, meningitis, pancreatic stones presenting with palpitations which started today. She states that she had 2-3 episodes where she had brief palpitations which felt like her heart skipped a beat and then felt back to normal.  During 1 of these episodes she felt a slight feeling of dropping. She did not have chest pain or dyspnea. She denies palpitations in a sustained fashion, lightheadedness or syncope. She has not had similar symptoms in the past.  She denies history of CAD, arrhythmia. She follows with Dr. Marin Jordan. Irregular Heart Beat   Pertinent negatives include no fever, no chest pain, no abdominal pain, no headaches, no back pain and no shortness of breath.       Past Medical History:   Diagnosis Date    Aneurysm, cerebral     bilat    Heart failure (HCC)     High cholesterol     Meningitis     Pancreatic stones        Past Surgical History:   Procedure Laterality Date    HX CAROTID ENDARTERECTOMY      bilat    HX HEART CATHETERIZATION      stent Lcflx     HX OTHER SURGICAL  2013    Nuclear stress negative for ischemia, EF 70%    HX OTHER SURGICAL  3.    EF 60% PaSP 52 mmhg, mild MR          Family History:   Problem Relation Age of Onset    Heart Disease Father     Stroke Sister     Cancer Sister        Social History     Socioeconomic History    Marital status:      Spouse name: Not on file    Number of children: Not on file    Years of education: Not on file    Highest education level: Not on file   Occupational History    Not on file   Tobacco Use    Smoking status: Former     Types: Cigarettes     Quit date: 1993     Years since quittin.7    Smokeless tobacco: Never   Substance and Sexual Activity    Alcohol use: Yes     Comment: 6-7 drinks per week    Drug use: No    Sexual activity: Not on file Other Topics Concern    Not on file   Social History Narrative    Not on file     Social Determinants of Health     Financial Resource Strain: Not on file   Food Insecurity: Not on file   Transportation Needs: Not on file   Physical Activity: Not on file   Stress: Not on file   Social Connections: Not on file   Intimate Partner Violence: Not on file   Housing Stability: Not on file         ALLERGIES: Ciprofloxacin and Codeine    Review of Systems   Constitutional:  Negative for chills and fever. Eyes:  Negative for photophobia. Respiratory:  Negative for shortness of breath. Cardiovascular:  Positive for palpitations. Negative for chest pain. Gastrointestinal:  Negative for abdominal pain. Genitourinary:  Negative for dysuria. Musculoskeletal:  Negative for back pain. Neurological:  Negative for headaches. Psychiatric/Behavioral:  Negative for confusion. All other systems reviewed and are negative. Vitals:    01/10/23 1950   BP: (!) 162/72   Pulse: (!) 54   Resp: 16   Temp: 98.1 °F (36.7 °C)   SpO2: 96%   Weight: 78 kg (172 lb)   Height: 5' 4\" (1.626 m)            Physical Exam  Constitutional:       General: She is not in acute distress. Appearance: She is not toxic-appearing. HENT:      Head: Normocephalic and atraumatic. Mouth/Throat:      Mouth: Mucous membranes are moist.   Eyes:      Extraocular Movements: Extraocular movements intact. Cardiovascular:      Rate and Rhythm: Normal rate and regular rhythm. Heart sounds: Normal heart sounds. Pulmonary:      Effort: Pulmonary effort is normal. No respiratory distress. Breath sounds: Normal breath sounds. Abdominal:      Palpations: Abdomen is soft. Tenderness: There is no abdominal tenderness. Musculoskeletal:      Cervical back: Normal range of motion. Right lower leg: No edema. Left lower leg: No edema. Skin:     Capillary Refill: Capillary refill takes less than 2 seconds.    Neurological: General: No focal deficit present. Mental Status: She is alert and oriented to person, place, and time. Psychiatric:         Mood and Affect: Mood normal.        Medical Decision Making  Amount and/or Complexity of Data Reviewed  Labs: ordered. ECG/medicine tests: ordered. Procedures        EK sinus bradycardia ventricular 48 normal axis, no ST elevation. Patient presented with intermittent palpitations which are very brief and lasted for only few seconds. He feels like she had an unusual single heartbeat. Occurred 2-3 times. More likely PAC or PVC. She has not had any sustained arrhythmia. Her heart rate is relatively low. This may be due to recent VALENTE and associated AV patricia blocker use. Encouraged to increase her p.o. fluid intake. She is afebrile, thyroid and electrolytes are within normal limits. She will follow-up with her cardiologist.  Considered ventricular arrhythmia, atrial fibrillation or other life-threatening arrhythmias. She does not have evidence of this. Her symptoms are also dissimilar. She is amenable and much in favor of a plan of outpatient follow-up. PROGRESS NOTE:  9:54 PM  The patient has been re-evaluated and are stable for discharge. All available radiology and laboratory results have been reviewed with patient and/or available family. Patient and/or family verbally conveyed their understanding and agreement of the patient's signs, symptoms, diagnosis, treatment and prognosis and additionally agree to follow-up as recommended in the discharge instructions or to return to the Emergency Department should their condition change or worsen prior to their follow-up appointment. All questions have been answered and patient and/or available family who express understanding.       LABORATORY RESULTS:  Labs Reviewed   CBC WITH AUTOMATED DIFF - Abnormal; Notable for the following components:       Result Value    RBC 3.77 (*)     EOSINOPHILS 10 (*) IMMATURE GRANULOCYTES 1 (*)     ABS. EOSINOPHILS 0.7 (*)     ABS. IMM. GRANS. 0.1 (*)     All other components within normal limits   METABOLIC PANEL, COMPREHENSIVE - Abnormal; Notable for the following components:    Chloride 109 (*)     Glucose 125 (*)     BUN 54 (*)     Creatinine 1.61 (*)     BUN/Creatinine ratio 34 (*)     eGFR 32 (*)     AST (SGOT) 12 (*)     A-G Ratio 1.0 (*)     All other components within normal limits   TROPONIN-HIGH SENSITIVITY   SAMPLES BEING HELD   MAGNESIUM   TSH 3RD GENERATION       IMAGING RESULTS:  No orders to display       MEDICATIONS GIVEN:  Medications - No data to display    IMPRESSION:  1. Palpitations    2. VALENTE (acute kidney injury) ([facility] [address]        PLAN:  Follow-up Information       Follow up With Specialties Details Why Contact Info    Ca Carlson MD Internal Medicine Physician Schedule an appointment as soon as possible for a visit  Call for a follow up appointment. [address] [place] [address]      Rashid Choudhury MD Cardiovascular Disease Physician Schedule an appointment as soon as possible for a visit  Call for a follow up appointment. [facility]  [phone]             Discharge Medication List as of 1/10/2023  9:30 PM            Dana Peterson MD      Please note that this dictation was completed with [employer], the computer voice recognition software. Quite often unanticipated grammatical, syntax, homophones, and other interpretive errors are inadvertently transcribed by the computer software. Please disregard these errors. Please excuse any errors that have escaped final proofreading.

## 2023-01-11 NOTE — DISCHARGE INSTRUCTIONS
Follow-up with Dr. Mckenna Camilo. He can order an outpatient Holter monitor. You should return if he has any new or concerning symptoms especially chest pain. Make sure you take usual medications as prescribed. Your kidney function was slightly abnormal compared to the past.  Your creatinine was 1.6. This is something that is likely related to decreased oral intake of fluids. Make sure you drink plenty of fluids and follow-up with primary care or cardiology to have your creatinine level rechecked.

## 2023-01-11 NOTE — ED TRIAGE NOTES
Patient states she has an \"odd\" feeling in her chest and she checked her pulse and it was irregular. . patient states they are coming in episodes and last a few minutes at a time. Patient states their have been three times but currently is not feeling any symptoms.

## 2023-02-05 ENCOUNTER — HOSPITAL ENCOUNTER (EMERGENCY)
Age: 83
Discharge: HOME | End: 2023-02-05
Attending: EMERGENCY MEDICINE
Payer: MEDICARE

## 2023-02-05 VITALS
BODY MASS INDEX: 29.71 KG/M2 | WEIGHT: 174 LBS | TEMPERATURE: 97.2 F | HEIGHT: 64 IN | SYSTOLIC BLOOD PRESSURE: 150 MMHG | DIASTOLIC BLOOD PRESSURE: 42 MMHG | OXYGEN SATURATION: 100 % | HEART RATE: 51 BPM | RESPIRATION RATE: 16 BRPM

## 2023-02-05 DIAGNOSIS — M10.9 ACUTE GOUT OF LEFT FOOT, UNSPECIFIED CAUSE: Primary | ICD-10-CM

## 2023-02-05 PROCEDURE — 99283 EMERGENCY DEPT VISIT LOW MDM: CPT

## 2023-02-05 PROCEDURE — 74011250637 HC RX REV CODE- 250/637: Performed by: NURSE PRACTITIONER

## 2023-02-05 RX ORDER — HYDROCODONE BITARTRATE AND ACETAMINOPHEN 5; 300 MG/1; MG/1
1 TABLET ORAL
Qty: 20 TABLET | Refills: 0 | Status: SHIPPED | OUTPATIENT
Start: 2023-02-05 | End: 2023-02-06

## 2023-02-05 RX ORDER — OXYCODONE AND ACETAMINOPHEN 5; 325 MG/1; MG/1
1 TABLET ORAL
Status: COMPLETED | OUTPATIENT
Start: 2023-02-05 | End: 2023-02-05

## 2023-02-05 RX ADMIN — OXYCODONE AND ACETAMINOPHEN 1 TABLET: 5; 325 TABLET ORAL at 15:15

## 2023-02-05 NOTE — DISCHARGE INSTRUCTIONS
Thank you for coming to the emergency room. I have given you a Percocet here in the emergency room and I am sending in Vicodin to your pharmacy. You can take that every 4-6 hours as needed for pain. Give Dr. Deanna Schirmer a call tomorrow and schedule follow-up. Any of fluids.

## 2023-02-05 NOTE — Clinical Note
Parul Bhat was seen and treated in our emergency department on 2/5/2023. Maya Klein was sick from January 30, 2023 to February 1, 2023. Please excuse from school.   Thanks,   SALEEM Chacon MD

## 2023-02-05 NOTE — ED NOTES
PT to room 7 w/ c/o gout pain in her L foot. She advises she is on a medrol dose pack for same, but the pain is too great.  A&O.

## 2023-02-05 NOTE — ED PROVIDER NOTES
Jhonathan Ochoa is a 82 y.o. female with Hx of heart failure, high cholesterol, hypertension who presents with  to [facility] ED with cc of left foot pain. Patient states she was diagnosed with gout and is finishing a Medrol Dosepak. But that her foot has gotten more red and swollen and painful. Seems to be more localized in the joint of the MTP. She could not stand the pain today. PCP: Lennox Abdul MD    There are no other complaints, changes or physical findings at this time.    Foot Pain        Past Medical History:   Diagnosis Date    Aneurysm, cerebral     bilat    Heart failure (HCC)     High cholesterol     Meningitis     Pancreatic stones        Past Surgical History:   Procedure Laterality Date    HX CAROTID ENDARTERECTOMY      bilat    HX HEART CATHETERIZATION      stent Lcflx     HX OTHER SURGICAL  2013    Nuclear stress negative for ischemia, EF 70%    HX OTHER SURGICAL  3.    EF 60% PaSP 52 mmhg, mild MR          Family History:   Problem Relation Age of Onset    Heart Disease Father     Stroke Sister     Cancer Sister        Social History     Socioeconomic History    Marital status:      Spouse name: Not on file    Number of children: Not on file    Years of education: Not on file    Highest education level: Not on file   Occupational History    Not on file   Tobacco Use    Smoking status: Former     Types: Cigarettes     Quit date: 1993     Years since quittin.8    Smokeless tobacco: Never   Vaping Use    Vaping Use: Never used   Substance and Sexual Activity    Alcohol use: Yes     Comment: 6-7 drinks per week    Drug use: No    Sexual activity: Not Currently   Other Topics Concern    Not on file   Social History Narrative    Not on file     Social Determinants of Health     Financial Resource Strain: Not on file   Food Insecurity: Not on file   Transportation Needs: Not on file   Physical Activity: Not on file   Stress: Not on file   Social Connections: Not on file   Intimate Partner Violence: Not on file   Housing Stability: Not on file         ALLERGIES: Ciprofloxacin and Codeine    Review of Systems   Constitutional:  Negative for activity change, appetite change, chills and fever. HENT:  Negative for congestion, rhinorrhea and sore throat. Eyes:  Negative for visual disturbance. Respiratory:  Negative for cough and shortness of breath. Cardiovascular:  Negative for chest pain. Gastrointestinal:  Negative for abdominal distention, diarrhea, nausea and vomiting. Genitourinary:  Negative for difficulty urinating, dysuria and menstrual problem. Musculoskeletal:  Positive for arthralgias. Negative for gait problem and myalgias. Skin:  Positive for color change. Negative for rash. Neurological:  Negative for weakness and headaches. Psychiatric/Behavioral:  Negative for agitation, behavioral problems and confusion. Vitals:    02/05/23 1430   BP: (!) 150/42   Pulse: (!) 51   Resp: 16   Temp: 97.2 °F (36.2 °C)   SpO2: 100%   Weight: 78.9 kg (174 lb)   Height: 5' 4\" (1.626 m)            Physical Exam  Vitals and nursing note reviewed. Constitutional:       Appearance: Normal appearance. HENT:      Head: Normocephalic and atraumatic. Right Ear: External ear normal.      Left Ear: External ear normal.   Eyes:      Extraocular Movements: Extraocular movements intact. Conjunctiva/sclera: Conjunctivae normal.      Pupils: Pupils are equal, round, and reactive to light. Cardiovascular:      Rate and Rhythm: Normal rate and regular rhythm. Pulmonary:      Effort: Pulmonary effort is normal.      Breath sounds: Normal breath sounds. Abdominal:      General: Abdomen is flat. Palpations: Abdomen is soft. Musculoskeletal:         General: Swelling and tenderness present. No deformity or signs of injury. Normal range of motion. Cervical back: Normal range of motion and neck supple.       Right lower leg: No edema. Left lower leg: No edema. Skin:     General: Skin is warm and dry. Neurological:      General: No focal deficit present. Mental Status: She is alert and oriented to person, place, and time. Mental status is at baseline. Psychiatric:         Mood and Affect: Mood normal.         Thought Content: Thought content normal.         Judgment: Judgment normal.        Medical Decision Making  Differential diagnosis; gout versus cellulitis. 27-year-old female who is healthy appearing presents with a left MTP joint that is red and swollen and painful to touch. She states she is spoke to her doctor put her on a Medrol Dosepak. But that the pain which is worse today she says if she does not move it feels better but if she stands on it it is painful. This is localized to the joint. I have based on her kidney function I am not inclined to start her on cold she is seen. Therefore I have given her a Percocet in the ER and sent in some Vicodin for pain. Patient has good rapport with her primary care provider will call them tomorrow and make a follow-up appointment. Amendable to plan and discharged    Risk  Prescription drug management.            Procedures

## 2023-02-05 NOTE — Clinical Note
Jessica Perry was seen and treated in our emergency department on 2/5/2023. Kyleigh Phelan was sick from January 30, 2023 to February 1, 2023. Please excuse from school.   Thanks,   SALEEM Wall MD

## 2023-02-06 RX ORDER — HYDROCODONE BITARTRATE AND ACETAMINOPHEN 5; 325 MG/1; MG/1
1 TABLET ORAL
Qty: 15 TABLET | Refills: 0 | Status: SHIPPED | OUTPATIENT
Start: 2023-02-06 | End: 2023-02-09

## 2023-05-20 ENCOUNTER — APPOINTMENT (OUTPATIENT)
Facility: HOSPITAL | Age: 83
End: 2023-05-20
Payer: COMMERCIAL

## 2023-05-20 ENCOUNTER — HOSPITAL ENCOUNTER (EMERGENCY)
Facility: HOSPITAL | Age: 83
Discharge: HOME | End: 2023-05-20
Attending: EMERGENCY MEDICINE
Payer: COMMERCIAL

## 2023-05-20 VITALS
HEART RATE: 75 BPM | TEMPERATURE: 98.4 F | HEIGHT: 64 IN | DIASTOLIC BLOOD PRESSURE: 58 MMHG | BODY MASS INDEX: 29.37 KG/M2 | SYSTOLIC BLOOD PRESSURE: 155 MMHG | RESPIRATION RATE: 18 BRPM | WEIGHT: 172 LBS | OXYGEN SATURATION: 97 %

## 2023-05-20 DIAGNOSIS — J40 BRONCHITIS: Primary | ICD-10-CM

## 2023-05-20 DIAGNOSIS — J20.9 ACUTE BRONCHITIS, UNSPECIFIED ORGANISM: ICD-10-CM

## 2023-05-20 DIAGNOSIS — M79.602 LEFT ARM PAIN: ICD-10-CM

## 2023-05-20 LAB
ALBUMIN SERPL-MCNC: 4.1 G/DL (ref 3.5–5.2)
ALBUMIN/GLOB SERPL: 1.3 (ref 1.1–2.2)
ALP SERPL-CCNC: 86 U/L (ref 35–104)
ALT SERPL-CCNC: 11 U/L (ref 10–35)
ANION GAP SERPL CALC-SCNC: 12 MMOL/L (ref 5–15)
AST SERPL-CCNC: 21 U/L (ref 10–35)
BASOPHILS # BLD: 0 K/UL (ref 0–0.1)
BASOPHILS NFR BLD: 0 % (ref 0–1)
BILIRUB SERPL-MCNC: 0.5 MG/DL (ref 0.2–1)
BUN SERPL-MCNC: 28 MG/DL (ref 8–23)
BUN/CREAT SERPL: 28 (ref 12–20)
CALCIUM SERPL-MCNC: 9.2 MG/DL (ref 8.8–10.2)
CHLORIDE SERPL-SCNC: 104 MMOL/L (ref 98–107)
CO2 SERPL-SCNC: 24 MMOL/L (ref 22–29)
COMMENT:: NORMAL
CREAT SERPL-MCNC: 0.99 MG/DL (ref 0.5–0.9)
DIFFERENTIAL METHOD BLD: ABNORMAL
EOSINOPHIL # BLD: 0.4 K/UL (ref 0–0.4)
EOSINOPHIL NFR BLD: 5 % (ref 0–7)
ERYTHROCYTE [DISTWIDTH] IN BLOOD BY AUTOMATED COUNT: 13.3 % (ref 11.5–14.5)
GLOBULIN SER CALC-MCNC: 3.1 G/DL (ref 2–4)
GLUCOSE SERPL-MCNC: 92 MG/DL (ref 65–100)
HCT VFR BLD AUTO: 37.1 % (ref 35–47)
HGB BLD-MCNC: 12.3 G/DL (ref 11.5–16)
IMM GRANULOCYTES # BLD AUTO: 0 K/UL (ref 0–0.04)
IMM GRANULOCYTES NFR BLD AUTO: 1 % (ref 0–0.5)
LYMPHOCYTES # BLD: 2.7 K/UL (ref 0.8–3.5)
LYMPHOCYTES NFR BLD: 36 % (ref 12–49)
MCH RBC QN AUTO: 31.9 PG (ref 26–34)
MCHC RBC AUTO-ENTMCNC: 33.2 G/DL (ref 30–36.5)
MCV RBC AUTO: 96.4 FL (ref 80–99)
MONOCYTES # BLD: 1 K/UL (ref 0–1)
MONOCYTES NFR BLD: 13 % (ref 5–13)
NEUTS SEG # BLD: 3.5 K/UL (ref 1.8–8)
NEUTS SEG NFR BLD: 46 % (ref 32–75)
NRBC # BLD: 0 K/UL (ref 0–0.01)
NRBC BLD-RTO: 0 PER 100 WBC
PLATELET # BLD AUTO: 264 K/UL (ref 150–400)
POTASSIUM SERPL-SCNC: 4.3 MMOL/L (ref 3.5–5.1)
PROT SERPL-MCNC: 7.2 G/DL (ref 6.4–8.3)
RBC # BLD AUTO: 3.85 M/UL (ref 3.8–5.2)
SODIUM SERPL-SCNC: 140 MMOL/L (ref 136–145)
SPECIMEN HOLD: NORMAL
TROPONIN I BLD-MCNC: <0.04 NG/ML (ref 0–0.08)
WBC # BLD AUTO: 7.7 K/UL (ref 3.6–11)

## 2023-05-20 PROCEDURE — 99285 EMERGENCY DEPT VISIT HI MDM: CPT

## 2023-05-20 PROCEDURE — 84484 ASSAY OF TROPONIN QUANT: CPT

## 2023-05-20 PROCEDURE — 93005 ELECTROCARDIOGRAM TRACING: CPT | Performed by: EMERGENCY MEDICINE

## 2023-05-20 PROCEDURE — 85025 COMPLETE CBC W/AUTO DIFF WBC: CPT

## 2023-05-20 PROCEDURE — 36415 COLL VENOUS BLD VENIPUNCTURE: CPT

## 2023-05-20 PROCEDURE — 80053 COMPREHEN METABOLIC PANEL: CPT

## 2023-05-20 PROCEDURE — 71045 X-RAY EXAM CHEST 1 VIEW: CPT

## 2023-05-20 RX ORDER — AZITHROMYCIN 250 MG/1
250 TABLET, FILM COATED ORAL SEE ADMIN INSTRUCTIONS
Qty: 6 TABLET | Refills: 0 | Status: SHIPPED | OUTPATIENT
Start: 2023-05-20 | End: 2023-05-25

## 2023-05-20 ASSESSMENT — PAIN DESCRIPTION - LOCATION: LOCATION: ARM

## 2023-05-20 ASSESSMENT — LIFESTYLE VARIABLES
HOW OFTEN DO YOU HAVE A DRINK CONTAINING ALCOHOL: NEVER
HOW MANY STANDARD DRINKS CONTAINING ALCOHOL DO YOU HAVE ON A TYPICAL DAY: PATIENT DOES NOT DRINK

## 2023-05-20 ASSESSMENT — PAIN DESCRIPTION - ORIENTATION: ORIENTATION: LEFT

## 2023-05-20 ASSESSMENT — PAIN SCALES - GENERAL: PAINLEVEL_OUTOF10: 3

## 2023-05-20 NOTE — ED NOTES
Assumed care of pt at this time. Report received from [facility]. Pt resting in bed in NAD. AAO x 4. Respirations regular/unlabored. Denies any needs at his time.       Katie Kelly RN  05/20/23 3701

## 2023-05-20 NOTE — ED NOTES
Patient with c/o left arm pain on assessment. Patient denies chest pain or shortness of breath.       Edna Gutierrez RN  05/20/23 0811

## 2023-05-20 NOTE — ED TRIAGE NOTES
Pt c/o chest congestion x1 week, recently around sick family, endorses productive cough, denies fevers, nausea, or vomiting, pt endorses generalized fatigue, reports taking cough medicine at home with some relief

## 2023-05-20 NOTE — ED NOTES
I have reviewed discharge instructions with the patient and daughter. The patient and daughter verbalized understanding.       Karissa Salcido RN  05/20/23 1304

## 2023-05-21 LAB
EKG ATRIAL RATE: 69 BPM
EKG DIAGNOSIS: NORMAL
EKG P AXIS: 59 DEGREES
EKG P-R INTERVAL: 154 MS
EKG Q-T INTERVAL: 374 MS
EKG QRS DURATION: 78 MS
EKG QTC CALCULATION (BAZETT): 400 MS
EKG R AXIS: -5 DEGREES
EKG T AXIS: 31 DEGREES
EKG VENTRICULAR RATE: 69 BPM

## 2023-05-31 ASSESSMENT — ENCOUNTER SYMPTOMS
COUGH: 1
NAUSEA: 0
SINUS CONGESTION: 1
DIARRHEA: 0
VOMITING: 0
FACIAL SWELLING: 0
SORE THROAT: 0
BACK PAIN: 0
ABDOMINAL PAIN: 0
EYE DISCHARGE: 0
EYE PAIN: 0
SHORTNESS OF BREATH: 0
CHEST TIGHTNESS: 0

## 2023-05-31 NOTE — ED PROVIDER NOTES
[facility] EMERGENCY DEPT  EMERGENCY DEPARTMENT ENCOUNTER      Pt Name: Angel Roy  MRN: [id number]  [place] 1940  Date of evaluation: 5/20/2023  Provider: Seth Reyna MD    [address]       Chief Complaint   Patient presents with    Cough         HISTORY OF PRESENT ILLNESS   (Location/Symptom, Timing/Onset, Context/Setting, Quality, Duration, Modifying Factors, Severity)  Note limiting factors. 59-year-old female presents with cough. She has had exposure to some ill family members over the past several days. She has no fever. No dyspnea nor hypoxia reported. No fever. The history is provided by the patient. Cough  Cough characteristics:  Productive  Sputum characteristics:  Nondescript  Severity:  Mild  Onset quality:  Gradual  Duration:  2 days  Timing:  Intermittent  Progression:  Waxing and waning  Chronicity:  New  Smoker: no    Context: not animal exposure, not exposure to allergens, not fumes, not occupational exposure, not sick contacts and not smoke exposure    Relieved by:  Nothing  Worsened by:  Nothing  Ineffective treatments:  None tried  Associated symptoms: sinus congestion    Associated symptoms: no chest pain, no chills, no diaphoresis, no ear fullness, no ear pain, no eye discharge, no fever, no headaches, no myalgias, no rash, no shortness of breath, no sore throat and no weight loss        Review of External Medical Records:     Nursing Notes were reviewed. REVIEW OF SYSTEMS    (2-9 systems for level 4, 10 or more for level 5)     Review of Systems   Constitutional:  Negative for activity change, appetite change, chills, diaphoresis, fever and weight loss. HENT:  Negative for congestion, ear pain, facial swelling and sore throat. Eyes:  Negative for pain, discharge and visual disturbance. Respiratory:  Positive for cough. Negative for chest tightness and shortness of breath. Cardiovascular:  Negative for chest pain and palpitations.

## 2024-06-04 ENCOUNTER — APPOINTMENT (OUTPATIENT)
Facility: HOSPITAL | Age: 84
End: 2024-06-04
Payer: COMMERCIAL

## 2024-06-04 ENCOUNTER — HOSPITAL ENCOUNTER (EMERGENCY)
Facility: HOSPITAL | Age: 84
Discharge: HOME | End: 2024-06-04
Attending: EMERGENCY MEDICINE
Payer: COMMERCIAL

## 2024-06-04 VITALS
HEART RATE: 65 BPM | TEMPERATURE: 97.6 F | BODY MASS INDEX: 29.37 KG/M2 | SYSTOLIC BLOOD PRESSURE: 131 MMHG | DIASTOLIC BLOOD PRESSURE: 71 MMHG | RESPIRATION RATE: 18 BRPM | HEIGHT: 64 IN | WEIGHT: 172 LBS | OXYGEN SATURATION: 100 %

## 2024-06-04 DIAGNOSIS — M48.02 CERVICAL STENOSIS OF SPINAL CANAL: ICD-10-CM
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
## 2024-06-04 DIAGNOSIS — S09.90XA INJURY OF HEAD, INITIAL ENCOUNTER: ICD-10-CM

## 2024-06-04 PROCEDURE — 72125 CT NECK SPINE W/O DYE: CPT

## 2024-06-04 PROCEDURE — 99284 EMERGENCY DEPT VISIT MOD MDM: CPT

## 2024-06-04 PROCEDURE — 70450 CT HEAD/BRAIN W/O DYE: CPT

## 2024-06-04 PROCEDURE — 70486 CT MAXILLOFACIAL W/O DYE: CPT

## 2024-06-04 ASSESSMENT — LIFESTYLE VARIABLES
HOW MANY STANDARD DRINKS CONTAINING ALCOHOL DO YOU HAVE ON A TYPICAL DAY: PATIENT DOES NOT DRINK
HOW OFTEN DO YOU HAVE A DRINK CONTAINING ALCOHOL: NEVER

## 2024-06-04 ASSESSMENT — PAIN SCALES - GENERAL
PAINLEVEL_OUTOF10: 5
PAINLEVEL_OUTOF10: 3

## 2024-06-04 ASSESSMENT — PAIN - FUNCTIONAL ASSESSMENT: PAIN_FUNCTIONAL_ASSESSMENT: 0-10

## 2024-06-04 NOTE — ED TRIAGE NOTES
Pt arrives to ED via EMS from [facility] where pt was shopping when she \"tripped and fell on the way to the bathroom\".  Pt hit her head on the tile floor, denies LOC, no blood thinners.  Pt reports she was \"in a hurry due to recent diarrhea\".  Pt also reports a fall 2 weeks ago [place].  Pt reports this was also a mechanical fall and was not due to dizziness.  Pt was told she had a concussion following.  Pt reports pain to be \"a 5/10 but mostly feels foggy\".  Pt denies dizziness, vomiting.  Pt has swelling above right eye and nose.  Pt bit her lip.  Pt denies vision changes.  Bleeding controlled in triage.

## 2024-06-04 NOTE — ED NOTES
Received patient in signout.  84-year-old female on no blood thinners presenting for mechanical fall.    Signed out to me pending CT results and likely discharge.    On my exam, patient ANO x 3.  Moving all extremities equally.  She has ecchymosis to the right periorbital region and diffusely over the bridge of her nose.  There is no epistaxis.  Extraocular movements are intact.    CT results appreciated.  Only significant finding is cervical arthritis with some central canal stenosis.  At this time she has no sensory deficit in her upper extremities, and is neuro intact.    She does state in the past she has had some intermittent tingling of her bilateral hands.  Will recommend orthopedics follow-up for further care.  Otherwise given concussion instructions, strict return precautions and discharged in stable condition.     Kp Gregg MD  06/04/24 [id number]

## 2024-11-23 ENCOUNTER — HOSPITAL ENCOUNTER (EMERGENCY)
Facility: HOSPITAL | Age: 84
Discharge: HOME | End: 2024-11-23
Attending: EMERGENCY MEDICINE
Payer: MEDICARE

## 2024-11-23 ENCOUNTER — APPOINTMENT (OUTPATIENT)
Facility: HOSPITAL | Age: 84
End: 2024-11-23
Payer: MEDICARE

## 2024-11-23 ENCOUNTER — APPOINTMENT (OUTPATIENT)
Dept: VASCULAR SURGERY | Facility: HOSPITAL | Age: 84
End: 2024-11-23
Attending: EMERGENCY MEDICINE
Payer: MEDICARE

## 2024-11-23 VITALS
RESPIRATION RATE: 18 BRPM | TEMPERATURE: 97.8 F | BODY MASS INDEX: 28.17 KG/M2 | OXYGEN SATURATION: 97 % | HEIGHT: 64 IN | WEIGHT: 165 LBS | HEART RATE: 72 BPM | DIASTOLIC BLOOD PRESSURE: 68 MMHG | SYSTOLIC BLOOD PRESSURE: 151 MMHG

## 2024-11-23 DIAGNOSIS — M10.9 ACUTE GOUT OF LEFT FOOT, UNSPECIFIED CAUSE: Primary | ICD-10-CM

## 2024-11-23 DIAGNOSIS — I73.9 PERIPHERAL ARTERY DISEASE (HCC): ICD-10-CM

## 2024-11-23 LAB
ANION GAP SERPL CALC-SCNC: 8 MMOL/L (ref 2–12)
BASOPHILS # BLD: 0 K/UL (ref 0–0.1)
BASOPHILS NFR BLD: 1 % (ref 0–1)
BUN SERPL-MCNC: 37 MG/DL (ref 6–20)
BUN/CREAT SERPL: 23 (ref 12–20)
CALCIUM SERPL-MCNC: 9 MG/DL (ref 8.5–10.1)
CHLORIDE SERPL-SCNC: 110 MMOL/L (ref 97–108)
CO2 SERPL-SCNC: 22 MMOL/L (ref 21–32)
CREAT SERPL-MCNC: 1.62 MG/DL (ref 0.55–1.02)
CRP SERPL-MCNC: 7.73 MG/DL (ref 0–0.3)
DIFFERENTIAL METHOD BLD: ABNORMAL
EOSINOPHIL # BLD: 0.5 K/UL (ref 0–0.4)
EOSINOPHIL NFR BLD: 5 % (ref 0–7)
ERYTHROCYTE [DISTWIDTH] IN BLOOD BY AUTOMATED COUNT: 13.5 % (ref 11.5–14.5)
ERYTHROCYTE [SEDIMENTATION RATE] IN BLOOD: 60 MM/HR (ref 0–30)
GLUCOSE SERPL-MCNC: 112 MG/DL (ref 65–100)
HCT VFR BLD AUTO: 35 % (ref 35–47)
HGB BLD-MCNC: 11 G/DL (ref 11.5–16)
IMM GRANULOCYTES # BLD AUTO: 0.1 K/UL (ref 0–0.04)
IMM GRANULOCYTES NFR BLD AUTO: 1 % (ref 0–0.5)
LYMPHOCYTES # BLD: 1.9 K/UL (ref 0.8–3.5)
LYMPHOCYTES NFR BLD: 23 % (ref 12–49)
MCH RBC QN AUTO: 30.6 PG (ref 26–34)
MCHC RBC AUTO-ENTMCNC: 31.4 G/DL (ref 30–36.5)
MCV RBC AUTO: 97.5 FL (ref 80–99)
MONOCYTES # BLD: 1 K/UL (ref 0–1)
MONOCYTES NFR BLD: 11 % (ref 5–13)
NEUTS SEG # BLD: 5 K/UL (ref 1.8–8)
NEUTS SEG NFR BLD: 59 % (ref 32–75)
NRBC # BLD: 0 K/UL (ref 0–0.01)
NRBC BLD-RTO: 0 PER 100 WBC
PLATELET # BLD AUTO: 297 K/UL (ref 150–400)
PMV BLD AUTO: 10.5 FL (ref 8.9–12.9)
POTASSIUM SERPL-SCNC: 4.3 MMOL/L (ref 3.5–5.1)
RBC # BLD AUTO: 3.59 M/UL (ref 3.8–5.2)
SODIUM SERPL-SCNC: 140 MMOL/L (ref 136–145)
URATE SERPL-MCNC: 9.4 MG/DL (ref 2.6–6)
WBC # BLD AUTO: 8.4 K/UL (ref 3.6–11)

## 2024-11-23 PROCEDURE — 73630 X-RAY EXAM OF FOOT: CPT

## 2024-11-23 PROCEDURE — 36415 COLL VENOUS BLD VENIPUNCTURE: CPT

## 2024-11-23 PROCEDURE — 86140 C-REACTIVE PROTEIN: CPT

## 2024-11-23 PROCEDURE — 85652 RBC SED RATE AUTOMATED: CPT

## 2024-11-23 PROCEDURE — 94761 N-INVAS EAR/PLS OXIMETRY MLT: CPT

## 2024-11-23 PROCEDURE — 93926 LOWER EXTREMITY STUDY: CPT

## 2024-11-23 PROCEDURE — 85025 COMPLETE CBC W/AUTO DIFF WBC: CPT

## 2024-11-23 PROCEDURE — 99284 EMERGENCY DEPT VISIT MOD MDM: CPT

## 2024-11-23 PROCEDURE — 84550 ASSAY OF BLOOD/URIC ACID: CPT

## 2024-11-23 PROCEDURE — 6370000000 HC RX 637 (ALT 250 FOR IP): Performed by: EMERGENCY MEDICINE

## 2024-11-23 PROCEDURE — 80048 BASIC METABOLIC PNL TOTAL CA: CPT

## 2024-11-23 RX ORDER — OXYCODONE HYDROCHLORIDE 5 MG/1
5 TABLET ORAL
Status: COMPLETED | OUTPATIENT
Start: 2024-11-23 | End: 2024-11-23

## 2024-11-23 RX ORDER — COLCHICINE 0.6 MG/1
TABLET ORAL
Qty: 30 TABLET | Refills: 0 | Status: SHIPPED | OUTPATIENT
Start: 2024-11-23 | End: 2024-11-24

## 2024-11-23 RX ORDER — ACETAMINOPHEN 500 MG
1000 TABLET ORAL
Status: COMPLETED | OUTPATIENT
Start: 2024-11-23 | End: 2024-11-23

## 2024-11-23 RX ORDER — ACETAMINOPHEN 500 MG
1000 TABLET ORAL EVERY 6 HOURS PRN
Qty: 40 TABLET | Refills: 0 | Status: SHIPPED | OUTPATIENT
Start: 2024-11-23 | End: 2024-11-24

## 2024-11-23 RX ORDER — OXYCODONE HYDROCHLORIDE 5 MG/1
5 TABLET ORAL EVERY 6 HOURS PRN
Qty: 12 TABLET | Refills: 0 | Status: SHIPPED | OUTPATIENT
Start: 2024-11-23 | End: 2024-11-24

## 2024-11-23 RX ADMIN — ACETAMINOPHEN 1000 MG: 500 TABLET ORAL at 20:00

## 2024-11-23 RX ADMIN — OXYCODONE 5 MG: 5 TABLET ORAL at 20:00

## 2024-11-23 ASSESSMENT — PAIN - FUNCTIONAL ASSESSMENT: PAIN_FUNCTIONAL_ASSESSMENT: PREVENTS OR INTERFERES SOME ACTIVE ACTIVITIES AND ADLS

## 2024-11-23 ASSESSMENT — PAIN DESCRIPTION - DESCRIPTORS: DESCRIPTORS: PRESSURE

## 2024-11-23 ASSESSMENT — PAIN DESCRIPTION - ORIENTATION: ORIENTATION: LEFT

## 2024-11-23 ASSESSMENT — PAIN SCALES - GENERAL: PAINLEVEL_OUTOF10: 5

## 2024-11-23 ASSESSMENT — PAIN DESCRIPTION - LOCATION: LOCATION: FOOT

## 2024-11-23 NOTE — ED TRIAGE NOTES
Pt arrives via EMS reporting left foot pain started hurting yesterday, pt woke up with pain and it got progressively worse.  Pt could not walk on it.  No injury, normal shoes, feet did not hurt Thursday night.  The whole foot hurts, particularly the inside and bottom.

## 2024-11-24 LAB
ECHO BSA: 1.84 M2
VAS LEFT ATA DIST PSV: 61.9 CM/S
VAS LEFT CFA PROX PSV: 177.7 CM/S
VAS LEFT PERONEAL DIST PSV: 40 CM/S
VAS LEFT POP A DIST PSV: 368.7 CM/S
VAS LEFT POP A PROX PSV: 62.9 CM/S
VAS LEFT POP A PROX VEL RATIO: 1.37
VAS LEFT PTA DIST PSV: 58 CM/S
VAS LEFT SFA DIST PSV: 45.8 CM/S
VAS LEFT SFA DIST VEL RATIO: 0.66
VAS LEFT SFA MID PSV: 69.1 CM/S
VAS LEFT SFA MID VEL RATIO: 0.32
VAS LEFT SFA PROX PSV: 212.7 CM/S
VAS LEFT SFA PROX VEL RATIO: 1.2

## 2024-11-24 RX ORDER — OXYCODONE HYDROCHLORIDE 5 MG/1
5 TABLET ORAL EVERY 6 HOURS PRN
Qty: 12 TABLET | Refills: 0 | Status: SHIPPED | OUTPATIENT
Start: 2024-11-24 | End: 2024-11-27

## 2024-11-24 RX ORDER — COLCHICINE 0.6 MG/1
TABLET ORAL
Qty: 30 TABLET | Refills: 0 | Status: SHIPPED | OUTPATIENT
Start: 2024-11-24

## 2024-11-24 RX ORDER — ACETAMINOPHEN 500 MG
1000 TABLET ORAL EVERY 6 HOURS PRN
Qty: 40 TABLET | Refills: 0 | Status: SHIPPED | OUTPATIENT
Start: 2024-11-24

## 2024-11-24 NOTE — ED PROVIDER NOTES
[facility] EMERGENCY DEPT  EMERGENCY DEPARTMENT ENCOUNTER      Pt Name: Bernie Linares  MRN: [id number]  Birthdate 1940  Date of evaluation: 11/23/2024  Provider: Leighton Valero MD    CHIEF COMPLAINT       Chief Complaint   Patient presents with    Foot Pain     Left         HISTORY OF PRESENT ILLNESS    84 y.o. female presents with atraumatic left foot pain which has dramatically worsened in the last day and now unable to ambulate on the limb. Pain with minimal movement or weight bearing.             Review of External Medical Records:     Nursing Notes were reviewed.    REVIEW OF SYSTEMS       Review of Systems    Except as noted above the remainder of the review of systems was reviewed and negative.       PAST MEDICAL HISTORY     Past Medical History:   Diagnosis Date    Aneurysm, cerebral     bilat    Heart failure (HCC)     High cholesterol     Meningitis     Pancreatic stones          SURGICAL HISTORY       Past Surgical History:   Procedure Laterality Date    CARDIAC CATHETERIZATION      stent Lcflx 2002    CAROTID ENDARTERECTOMY      bilat    OTHER SURGICAL HISTORY  3.2002    EF 60% PaSP 52 mmhg, mild MR     OTHER SURGICAL HISTORY  5-    Nuclear stress negative for ischemia, EF 70%         CURRENT MEDICATIONS       Discharge Medication List as of 11/23/2024  9:00 PM        CONTINUE these medications which have NOT CHANGED    Details   METOPROLOL TARTRATE PO Take by mouthHistorical Med      amLODIPine (NORVASC) 5 MG tablet Take 5 mg by mouth dailyHistorical Med      aspirin 81 MG EC tablet Take 81 mg by mouth dailyHistorical Med      atorvastatin (LIPITOR) 20 MG tablet Take 20 mg by mouthHistorical Med      celecoxib (CELEBREX) 200 MG capsule Take by mouth 2 times dailyHistorical Med      clopidogrel (PLAVIX) 75 MG tablet Take 75 mg by mouth dailyHistorical Med      escitalopram (LEXAPRO) 10 MG tablet Take 10 mg by mouth dailyHistorical Med      ezetimibe (ZETIA) 10 MG tablet Take 10 mg by mouth

## 2024-11-24 NOTE — ED NOTES
Pt unable to use crutches with two nurse education and demonstration.  Pt and  reports that they have a wheelchair at home and can get pt from car to downstairs and she can use a bedside commode at home.      Discussed discharge paperwork with pt including follow up care and prescriptions.

## 2025-07-04 ENCOUNTER — HOSPITAL ENCOUNTER (OUTPATIENT)
Facility: HOSPITAL | Age: 85
Setting detail: OBSERVATION
Discharge: HOME | End: 2025-07-06
Attending: EMERGENCY MEDICINE | Admitting: STUDENT IN AN ORGANIZED HEALTH CARE EDUCATION/TRAINING PROGRAM
Payer: MEDICARE

## 2025-07-04 ENCOUNTER — APPOINTMENT (OUTPATIENT)
Facility: HOSPITAL | Age: 85
End: 2025-07-04
Payer: MEDICARE

## 2025-07-04 DIAGNOSIS — R47.01 EXPRESSIVE APHASIA: Primary | ICD-10-CM

## 2025-07-04 DIAGNOSIS — I63.9 CEREBROVASCULAR ACCIDENT (CVA), UNSPECIFIED MECHANISM (HCC): ICD-10-CM

## 2025-07-04 LAB
ALBUMIN SERPL-MCNC: 3.6 G/DL (ref 3.5–5)
ALBUMIN/GLOB SERPL: 1.1 (ref 1.1–2.2)
ALP SERPL-CCNC: 73 U/L (ref 45–117)
ALT SERPL-CCNC: 15 U/L (ref 12–78)
ANION GAP SERPL CALC-SCNC: 7 MMOL/L (ref 2–12)
AST SERPL-CCNC: 13 U/L (ref 15–37)
BASOPHILS # BLD: 0.03 K/UL (ref 0–0.1)
BASOPHILS NFR BLD: 0.5 % (ref 0–1)
BILIRUB SERPL-MCNC: 0.5 MG/DL (ref 0.2–1)
BUN SERPL-MCNC: 32 MG/DL (ref 6–20)
BUN/CREAT SERPL: 23 (ref 12–20)
CALCIUM SERPL-MCNC: 9.4 MG/DL (ref 8.5–10.1)
CHLORIDE SERPL-SCNC: 107 MMOL/L (ref 97–108)
CO2 SERPL-SCNC: 26 MMOL/L (ref 21–32)
COMMENT:: NORMAL
CREAT SERPL-MCNC: 1.37 MG/DL (ref 0.55–1.02)
DIFFERENTIAL METHOD BLD: ABNORMAL
EOSINOPHIL # BLD: 0.58 K/UL (ref 0–0.4)
EOSINOPHIL NFR BLD: 10.3 % (ref 0–7)
ERYTHROCYTE [DISTWIDTH] IN BLOOD BY AUTOMATED COUNT: 12.9 % (ref 11.5–14.5)
GLOBULIN SER CALC-MCNC: 3.2 G/DL (ref 2–4)
GLUCOSE SERPL-MCNC: 92 MG/DL (ref 65–100)
HCT VFR BLD AUTO: 34.6 % (ref 35–47)
HGB BLD-MCNC: 11.3 G/DL (ref 11.5–16)
IMM GRANULOCYTES # BLD AUTO: 0.03 K/UL (ref 0–0.04)
IMM GRANULOCYTES NFR BLD AUTO: 0.5 % (ref 0–0.5)
LYMPHOCYTES # BLD: 1.83 K/UL (ref 0.8–3.5)
LYMPHOCYTES NFR BLD: 32.4 % (ref 12–49)
MAGNESIUM SERPL-MCNC: 2.2 MG/DL (ref 1.6–2.4)
MCH RBC QN AUTO: 31.6 PG (ref 26–34)
MCHC RBC AUTO-ENTMCNC: 32.7 G/DL (ref 30–36.5)
MCV RBC AUTO: 96.6 FL (ref 80–99)
MONOCYTES # BLD: 0.54 K/UL (ref 0–1)
MONOCYTES NFR BLD: 9.6 % (ref 5–13)
NEUTS SEG # BLD: 2.64 K/UL (ref 1.8–8)
NEUTS SEG NFR BLD: 46.7 % (ref 32–75)
NRBC # BLD: 0 K/UL (ref 0–0.01)
NRBC BLD-RTO: 0 PER 100 WBC
PLATELET # BLD AUTO: 334 K/UL (ref 150–400)
PMV BLD AUTO: 9.7 FL (ref 8.9–12.9)
POTASSIUM SERPL-SCNC: 4.6 MMOL/L (ref 3.5–5.1)
PROT SERPL-MCNC: 6.8 G/DL (ref 6.4–8.2)
RBC # BLD AUTO: 3.58 M/UL (ref 3.8–5.2)
SODIUM SERPL-SCNC: 140 MMOL/L (ref 136–145)
SPECIMEN HOLD: NORMAL
TROPONIN I SERPL HS-MCNC: 9 NG/L (ref 0–51)
WBC # BLD AUTO: 5.7 K/UL (ref 3.6–11)

## 2025-07-04 PROCEDURE — 99285 EMERGENCY DEPT VISIT HI MDM: CPT

## 2025-07-04 PROCEDURE — 6370000000 HC RX 637 (ALT 250 FOR IP): Performed by: STUDENT IN AN ORGANIZED HEALTH CARE EDUCATION/TRAINING PROGRAM

## 2025-07-04 PROCEDURE — 84484 ASSAY OF TROPONIN QUANT: CPT

## 2025-07-04 PROCEDURE — 83735 ASSAY OF MAGNESIUM: CPT

## 2025-07-04 PROCEDURE — 6360000004 HC RX CONTRAST MEDICATION: Performed by: EMERGENCY MEDICINE

## 2025-07-04 PROCEDURE — 6360000002 HC RX W HCPCS: Performed by: STUDENT IN AN ORGANIZED HEALTH CARE EDUCATION/TRAINING PROGRAM

## 2025-07-04 PROCEDURE — 36415 COLL VENOUS BLD VENIPUNCTURE: CPT

## 2025-07-04 PROCEDURE — 2500000003 HC RX 250 WO HCPCS: Performed by: STUDENT IN AN ORGANIZED HEALTH CARE EDUCATION/TRAINING PROGRAM

## 2025-07-04 PROCEDURE — 6370000000 HC RX 637 (ALT 250 FOR IP): Performed by: NURSE PRACTITIONER

## 2025-07-04 PROCEDURE — 6370000000 HC RX 637 (ALT 250 FOR IP): Performed by: EMERGENCY MEDICINE

## 2025-07-04 PROCEDURE — 96372 THER/PROPH/DIAG INJ SC/IM: CPT

## 2025-07-04 PROCEDURE — G0378 HOSPITAL OBSERVATION PER HR: HCPCS

## 2025-07-04 PROCEDURE — 94761 N-INVAS EAR/PLS OXIMETRY MLT: CPT

## 2025-07-04 PROCEDURE — 85025 COMPLETE CBC W/AUTO DIFF WBC: CPT

## 2025-07-04 PROCEDURE — 70450 CT HEAD/BRAIN W/O DYE: CPT

## 2025-07-04 PROCEDURE — 70496 CT ANGIOGRAPHY HEAD: CPT

## 2025-07-04 PROCEDURE — 93005 ELECTROCARDIOGRAM TRACING: CPT | Performed by: STUDENT IN AN ORGANIZED HEALTH CARE EDUCATION/TRAINING PROGRAM

## 2025-07-04 PROCEDURE — 80053 COMPREHEN METABOLIC PANEL: CPT

## 2025-07-04 RX ORDER — ENOXAPARIN SODIUM 100 MG/ML
40 INJECTION SUBCUTANEOUS DAILY
Status: DISCONTINUED | OUTPATIENT
Start: 2025-07-04 | End: 2025-07-06 | Stop reason: HOSPADM

## 2025-07-04 RX ORDER — METOPROLOL SUCCINATE 50 MG/1
50 TABLET, EXTENDED RELEASE ORAL DAILY
Status: DISCONTINUED | OUTPATIENT
Start: 2025-07-04 | End: 2025-07-06 | Stop reason: HOSPADM

## 2025-07-04 RX ORDER — ATORVASTATIN CALCIUM 20 MG/1
40 TABLET, FILM COATED ORAL DAILY
Status: DISCONTINUED | OUTPATIENT
Start: 2025-07-05 | End: 2025-07-06 | Stop reason: HOSPADM

## 2025-07-04 RX ORDER — SODIUM CHLORIDE 9 MG/ML
INJECTION, SOLUTION INTRAVENOUS PRN
Status: DISCONTINUED | OUTPATIENT
Start: 2025-07-04 | End: 2025-07-06 | Stop reason: HOSPADM

## 2025-07-04 RX ORDER — ONDANSETRON 4 MG/1
4 TABLET, ORALLY DISINTEGRATING ORAL EVERY 8 HOURS PRN
Status: DISCONTINUED | OUTPATIENT
Start: 2025-07-04 | End: 2025-07-06 | Stop reason: HOSPADM

## 2025-07-04 RX ORDER — ESCITALOPRAM OXALATE 10 MG/1
10 TABLET ORAL DAILY
Status: DISCONTINUED | OUTPATIENT
Start: 2025-07-05 | End: 2025-07-06 | Stop reason: HOSPADM

## 2025-07-04 RX ORDER — ASPIRIN 81 MG/1
81 TABLET, CHEWABLE ORAL DAILY
Status: DISCONTINUED | OUTPATIENT
Start: 2025-07-04 | End: 2025-07-06 | Stop reason: HOSPADM

## 2025-07-04 RX ORDER — AMLODIPINE BESYLATE 5 MG/1
10 TABLET ORAL DAILY
Status: DISCONTINUED | OUTPATIENT
Start: 2025-07-05 | End: 2025-07-06 | Stop reason: HOSPADM

## 2025-07-04 RX ORDER — ONDANSETRON 2 MG/ML
4 INJECTION INTRAMUSCULAR; INTRAVENOUS EVERY 6 HOURS PRN
Status: DISCONTINUED | OUTPATIENT
Start: 2025-07-04 | End: 2025-07-06 | Stop reason: HOSPADM

## 2025-07-04 RX ORDER — AMLODIPINE BESYLATE 5 MG/1
5 TABLET ORAL
Status: COMPLETED | OUTPATIENT
Start: 2025-07-04 | End: 2025-07-04

## 2025-07-04 RX ORDER — AMLODIPINE BESYLATE 5 MG/1
10 TABLET ORAL DAILY
Status: DISCONTINUED | OUTPATIENT
Start: 2025-07-04 | End: 2025-07-04 | Stop reason: SDUPTHER

## 2025-07-04 RX ORDER — POLYETHYLENE GLYCOL 3350 17 G/17G
17 POWDER, FOR SOLUTION ORAL DAILY PRN
Status: DISCONTINUED | OUTPATIENT
Start: 2025-07-04 | End: 2025-07-06 | Stop reason: HOSPADM

## 2025-07-04 RX ORDER — SODIUM CHLORIDE 0.9 % (FLUSH) 0.9 %
5-40 SYRINGE (ML) INJECTION PRN
Status: DISCONTINUED | OUTPATIENT
Start: 2025-07-04 | End: 2025-07-06 | Stop reason: HOSPADM

## 2025-07-04 RX ORDER — AMLODIPINE BESYLATE 5 MG/1
5 TABLET ORAL ONCE
Status: COMPLETED | OUTPATIENT
Start: 2025-07-04 | End: 2025-07-04

## 2025-07-04 RX ORDER — SODIUM CHLORIDE 0.9 % (FLUSH) 0.9 %
5-40 SYRINGE (ML) INJECTION EVERY 12 HOURS SCHEDULED
Status: DISCONTINUED | OUTPATIENT
Start: 2025-07-04 | End: 2025-07-06 | Stop reason: HOSPADM

## 2025-07-04 RX ORDER — IOPAMIDOL 755 MG/ML
100 INJECTION, SOLUTION INTRAVASCULAR
Status: COMPLETED | OUTPATIENT
Start: 2025-07-04 | End: 2025-07-04

## 2025-07-04 RX ORDER — ASPIRIN 300 MG/1
300 SUPPOSITORY RECTAL DAILY
Status: DISCONTINUED | OUTPATIENT
Start: 2025-07-04 | End: 2025-07-06 | Stop reason: HOSPADM

## 2025-07-04 RX ADMIN — AMLODIPINE BESYLATE 5 MG: 5 TABLET ORAL at 19:00

## 2025-07-04 RX ADMIN — ASPIRIN 81 MG: 81 TABLET, CHEWABLE ORAL at 19:00

## 2025-07-04 RX ADMIN — METOPROLOL SUCCINATE 50 MG: 50 TABLET, EXTENDED RELEASE ORAL at 21:27

## 2025-07-04 RX ADMIN — ENOXAPARIN SODIUM 40 MG: 100 INJECTION SUBCUTANEOUS at 19:00

## 2025-07-04 RX ADMIN — SODIUM CHLORIDE, PRESERVATIVE FREE 10 ML: 5 INJECTION INTRAVENOUS at 21:30

## 2025-07-04 RX ADMIN — AMLODIPINE BESYLATE 5 MG: 5 TABLET ORAL at 21:27

## 2025-07-04 RX ADMIN — IOPAMIDOL 100 ML: 755 INJECTION, SOLUTION INTRAVENOUS at 16:09

## 2025-07-04 ASSESSMENT — PAIN - FUNCTIONAL ASSESSMENT: PAIN_FUNCTIONAL_ASSESSMENT: NONE - DENIES PAIN

## 2025-07-04 NOTE — ED PROVIDER NOTES
Assumed care from Dr. Vaughan at 3 PM.  States that he had noted that patient was having difficulty with her speech while he was evaluating another patient.  The other patient is Bernie segundo.  She reports over last several months she has had occasional intermittent expressive aphasia.  She has a history of cerebral aneurysm with clip placement in the 90s.  Most likely this would not be amenable to doing an MRI.  Son at bedside states that she had 2 concussions within the last 2 years secondary to falls and has been having increasing difficulty with speech and thought processes.  Lab work was noncontributory.  She has a creatinine of 1.37 but she has elevated creatinine in the past.  CBC is within normal limits.  CT scan shows no evidence of acute intracranial abnormality.  She has stable encephalomalacia left parietal and occipital lobes.  Stable postsurgical changes of bilateral aneurysm clipping.  CTA shows no evidence of flow-limiting stenosis.  Status post bilateral carotid endarterectomies.  Discussed at length with patient follow-up versus admission.  Son at bedside as well as patient states that her difficulty with speech has substantially worsened over the last 2 days and they are concerned with going home.  Will speak with hospitalist service        6:47 PM  Noted elevated BP, patient states she hasn't taken her meds today, will write for something  Perfect Serve Consult for Admission  4:40 PM    ED Room Number: ER10/10  Patient Name and age:  Bernie Linares 85 y.o.  female  Working Diagnosis:   1. Expressive aphasia        COVID-19 Suspicion: No  Sepsis present:  No  Reassessment needed: No  Code Status:  Full Code  Readmission: No  Isolation Requirements: no  Recommended Level of Care: telemetry  Department: [place] ED - [phone]  Consulting Provider:     Other:          Radha Telles,   07/04/25 1643       Radha Telles,   07/04/25 7248    
absence of a cardiologist.        RADIOLOGY:   Non-plain film images such as CT, Ultrasound and MRI are read by the radiologist. Plain radiographic images are visualized and preliminarily interpreted by the emergency physician with the below findings:        Interpretation per the Radiologist below, if available at the time of this note:    CT Head W/O Contrast   Final Result   1. No evidence of acute intracranial abnormality.   2. Stable encephalomalacia in the left parietal and occipital lobes.   3. Stable postsurgical changes of bilateral aneurysm clipping.         Electronically signed by Nishant Solitario      CTA HEAD NECK W CONTRAST    (Results Pending)        LABS:  Labs Reviewed   CBC WITH AUTO DIFFERENTIAL - Abnormal; Notable for the following components:       Result Value    RBC 3.58 (*)     Hemoglobin 11.3 (*)     Hematocrit 34.6 (*)     Eosinophils % 10.3 (*)     Eosinophils Absolute 0.58 (*)     All other components within normal limits   EXTRA TUBES HOLD   COMPREHENSIVE METABOLIC PANEL   URINALYSIS WITH MICROSCOPIC   TROPONIN   MAGNESIUM       All other labs were within normal range or not returned as of this dictation.    EMERGENCY DEPARTMENT COURSE and DIFFERENTIAL DIAGNOSIS/MDM:   Vitals:    Vitals:    07/04/25 1427   BP: (!) 174/62   Pulse: 56   Resp: 16   Temp: 97.5 °F (36.4 °C)   TempSrc: Oral   SpO2: 97%   Weight: 70.3 kg (155 lb)   Height: 1.626 m (5' 4\")           Medical Decision Making  Differential includes but not limited to stroke, TIA, electrolyte abnormality.  Patient well-appearing exam has no focal deficits.  Occasionally has some expressive aphasia which she has had intermittently for several months, this has been ongoing for several days.  No history of stroke, history of cerebral aneurysm with clips.  Workup to include CBC CMP, CT head CTA head neck, patient will need to be prescreened for possible MRI if she can even receive 1 given her surgical clips.  Anticipate

## 2025-07-04 NOTE — ED TRIAGE NOTES
2:35 PM  I have evaluated the patient as the Provider in Rapid Medical Evaluation (RME). I have reviewed her vital signs and the triage nurse assessment. I have talked with the patient and any available family and advised that I am the provider in triage and have ordered the appropriate study to initiate their work up based on the clinical presentation during my assessment. I have advised that the patient will be accommodated in the Main ED as soon as possible. I have also requested to contact the triage nurse or myself immediately if the patient experiences any changes in their condition during this brief waiting period.    85 year old female with complaints of expressive aphasia that started about a month ago. Hx: aneurysms with clips, NO MRI's. Not on blood thinners. NO weakness. Seen ambulating to room.   HARDIK Penny - NP

## 2025-07-04 NOTE — ED NOTES
TRANSFER - OUT REPORT:    Verbal report given to Piedad on Bernie Linares  being transferred to [facility] for routine progression of patient care       Report consisted of patient's Situation, Background, Assessment and   Recommendations(SBAR).     Information from the following report(s) Nurse Handoff Report, MAR, Recent Results, Quality Measures, and Neuro Assessment was reviewed with the receiving nurse.    [place] Fall Assessment:    Presents to emergency department  because of falls (Syncope, seizure, or loss of consciousness): No  Age > 70: Yes  Altered Mental Status, Intoxication with alcohol or substance confusion (Disorientation, impaired judgment, poor safety awaremess, or inability to follow instructions): No  Impaired Mobility: Ambulates or transfers with assistive devices or assistance; Unable to ambulate or transer.: No  Nursing Judgement: No          Lines:   Peripheral IV 07/04/25 Right Antecubital (Active)        Opportunity for questions and clarification was provided.      Patient transported with:  Tech

## 2025-07-04 NOTE — PROGRESS NOTES
After completing PTA medications patient said she may have gotten some of the medications mixed up.

## 2025-07-04 NOTE — ED TRIAGE NOTES
Patient is an 85 year old female complaining of chronic speech issue. Patient reports that she was here with her  who is being admitted. Patient states she was encouraged to get seen today for her chronic issue. Patient alert and oriented x4, in NAD.

## 2025-07-04 NOTE — H&P
electronic medical records for all procedures/Xrays/labs and details which were not copied into this note but were reviewed prior to creation of Plan.

## 2025-07-05 ENCOUNTER — TELEPHONE (OUTPATIENT)
Facility: HOSPITAL | Age: 85
End: 2025-07-05

## 2025-07-05 ENCOUNTER — APPOINTMENT (OUTPATIENT)
Facility: HOSPITAL | Age: 85
End: 2025-07-05
Attending: STUDENT IN AN ORGANIZED HEALTH CARE EDUCATION/TRAINING PROGRAM
Payer: MEDICARE

## 2025-07-05 PROBLEM — R47.01 EXPRESSIVE APHASIA: Status: RESOLVED | Noted: 2025-07-04 | Resolved: 2025-07-05

## 2025-07-05 LAB
ANION GAP SERPL CALC-SCNC: 6 MMOL/L (ref 2–12)
BASOPHILS # BLD: 0.03 K/UL (ref 0–0.1)
BASOPHILS NFR BLD: 0.6 % (ref 0–1)
BUN SERPL-MCNC: 29 MG/DL (ref 6–20)
BUN/CREAT SERPL: 24 (ref 12–20)
CALCIUM SERPL-MCNC: 9.1 MG/DL (ref 8.5–10.1)
CHLORIDE SERPL-SCNC: 107 MMOL/L (ref 97–108)
CHOLEST SERPL-MCNC: 184 MG/DL
CO2 SERPL-SCNC: 26 MMOL/L (ref 21–32)
CREAT SERPL-MCNC: 1.19 MG/DL (ref 0.55–1.02)
DIFFERENTIAL METHOD BLD: ABNORMAL
ECHO AO ARCH DIAM: 2.3 CM
ECHO AO ASC DIAM: 3 CM
ECHO AO ASCENDING AORTA INDEX: 1.69 CM/M2
ECHO AO ROOT DIAM: 3.3 CM
ECHO AO ROOT INDEX: 1.86 CM/M2
ECHO AR MAX VEL PISA: 2.8 M/S
ECHO AV AREA PEAK VELOCITY: 2.5 CM2
ECHO AV AREA VTI: 2.6 CM2
ECHO AV AREA/BSA PEAK VELOCITY: 1.4 CM2/M2
ECHO AV AREA/BSA VTI: 1.5 CM2/M2
ECHO AV MEAN GRADIENT: 6 MMHG
ECHO AV MEAN VELOCITY: 1.2 M/S
ECHO AV PEAK GRADIENT: 11 MMHG
ECHO AV PEAK VELOCITY: 1.7 M/S
ECHO AV REGURGITANT PHT: 662.3 MS
ECHO AV VELOCITY RATIO: 0.65
ECHO AV VTI: 41.6 CM
ECHO BSA: 1.8 M2
ECHO LA DIAMETER INDEX: 2.03 CM/M2
ECHO LA DIAMETER: 3.6 CM
ECHO LA TO AORTIC ROOT RATIO: 1.09
ECHO LA VOL A-L A2C: 33 ML (ref 22–52)
ECHO LA VOL A-L A4C: 36 ML (ref 22–52)
ECHO LA VOL BP: 32 ML (ref 22–52)
ECHO LA VOL MOD A2C: 31 ML (ref 22–52)
ECHO LA VOL MOD A4C: 33 ML (ref 22–52)
ECHO LA VOL/BSA BIPLANE: 18 ML/M2 (ref 16–34)
ECHO LA VOLUME AREA LENGTH: 35 ML
ECHO LA VOLUME INDEX A-L A2C: 19 ML/M2 (ref 16–34)
ECHO LA VOLUME INDEX A-L A4C: 20 ML/M2 (ref 16–34)
ECHO LA VOLUME INDEX AREA LENGTH: 20 ML/M2 (ref 16–34)
ECHO LA VOLUME INDEX MOD A2C: 18 ML/M2 (ref 16–34)
ECHO LA VOLUME INDEX MOD A4C: 19 ML/M2 (ref 16–34)
ECHO LV E' LATERAL VELOCITY: 4.21 CM/S
ECHO LV E' SEPTAL VELOCITY: 4.73 CM/S
ECHO LV EDV A2C: 57 ML
ECHO LV EDV A4C: 98 ML
ECHO LV EDV BP: 75 ML (ref 56–104)
ECHO LV EDV INDEX A4C: 55 ML/M2
ECHO LV EDV INDEX BP: 42 ML/M2
ECHO LV EDV NDEX A2C: 32 ML/M2
ECHO LV EF PHYSICIAN: 48 %
ECHO LV EJECTION FRACTION A2C: 43 %
ECHO LV EJECTION FRACTION A4C: 65 %
ECHO LV ESV A2C: 33 ML
ECHO LV ESV A4C: 34 ML
ECHO LV ESV BP: 34 ML (ref 19–49)
ECHO LV ESV INDEX A2C: 19 ML/M2
ECHO LV ESV INDEX A4C: 19 ML/M2
ECHO LV ESV INDEX BP: 19 ML/M2
ECHO LV FRACTIONAL SHORTENING: 32 % (ref 28–44)
ECHO LV INTERNAL DIMENSION DIASTOLE INDEX: 2.99 CM/M2
ECHO LV INTERNAL DIMENSION DIASTOLIC: 5.3 CM (ref 3.9–5.3)
ECHO LV INTERNAL DIMENSION SYSTOLIC INDEX: 2.03 CM/M2
ECHO LV INTERNAL DIMENSION SYSTOLIC: 3.6 CM
ECHO LV IVSD: 0.7 CM (ref 0.6–0.9)
ECHO LV MASS 2D: 127 G (ref 67–162)
ECHO LV MASS INDEX 2D: 71.7 G/M2 (ref 43–95)
ECHO LV POSTERIOR WALL DIASTOLIC: 0.7 CM (ref 0.6–0.9)
ECHO LV RELATIVE WALL THICKNESS RATIO: 0.26
ECHO LVOT AREA: 3.8 CM2
ECHO LVOT AV VTI INDEX: 0.67
ECHO LVOT DIAM: 2.2 CM
ECHO LVOT MEAN GRADIENT: 2 MMHG
ECHO LVOT PEAK GRADIENT: 5 MMHG
ECHO LVOT PEAK VELOCITY: 1.1 M/S
ECHO LVOT STROKE VOLUME INDEX: 60.1 ML/M2
ECHO LVOT SV: 106.4 ML
ECHO LVOT VTI: 28 CM
ECHO MV A VELOCITY: 0.81 M/S
ECHO MV E DECELERATION TIME (DT): 386.7 MS
ECHO MV E VELOCITY: 0.57 M/S
ECHO MV E/A RATIO: 0.7
ECHO MV E/E' LATERAL: 13.54
ECHO MV E/E' RATIO (AVERAGED): 12.79
ECHO MV E/E' SEPTAL: 12.05
ECHO MV REGURGITANT PEAK GRADIENT: 92 MMHG
ECHO MV REGURGITANT PEAK VELOCITY: 4.8 M/S
ECHO PV MAX VELOCITY: 1.4 M/S
ECHO PV PEAK GRADIENT: 7 MMHG
ECHO RV FREE WALL PEAK S': 10.8 CM/S
ECHO RV INTERNAL DIMENSION: 3.2 CM
ECHO RV TAPSE: 1.8 CM (ref 1.7–?)
ECHO TV REGURGITANT MAX VELOCITY: 2.22 M/S
ECHO TV REGURGITANT PEAK GRADIENT: 20 MMHG
EOSINOPHIL # BLD: 0.61 K/UL (ref 0–0.4)
EOSINOPHIL NFR BLD: 11.2 % (ref 0–7)
ERYTHROCYTE [DISTWIDTH] IN BLOOD BY AUTOMATED COUNT: 12.6 % (ref 11.5–14.5)
EST. AVERAGE GLUCOSE BLD GHB EST-MCNC: 111 MG/DL
FERRITIN SERPL-MCNC: 67 NG/ML (ref 26–388)
FOLATE SERPL-MCNC: 12.7 NG/ML (ref 5–21)
GLUCOSE SERPL-MCNC: 87 MG/DL (ref 65–100)
HBA1C MFR BLD: 5.5 % (ref 4–5.6)
HCT VFR BLD AUTO: 32.2 % (ref 35–47)
HDLC SERPL-MCNC: 53 MG/DL
HDLC SERPL: 3.5 (ref 0–5)
HGB BLD-MCNC: 10.4 G/DL (ref 11.5–16)
IMM GRANULOCYTES # BLD AUTO: 0.02 K/UL (ref 0–0.04)
IMM GRANULOCYTES NFR BLD AUTO: 0.4 % (ref 0–0.5)
IRON SATN MFR SERPL: 63 % (ref 20–50)
IRON SERPL-MCNC: 150 UG/DL (ref 35–150)
LDLC SERPL CALC-MCNC: 108 MG/DL (ref 0–100)
LYMPHOCYTES # BLD: 2.05 K/UL (ref 0.8–3.5)
LYMPHOCYTES NFR BLD: 37.6 % (ref 12–49)
MCH RBC QN AUTO: 31.9 PG (ref 26–34)
MCHC RBC AUTO-ENTMCNC: 32.3 G/DL (ref 30–36.5)
MCV RBC AUTO: 98.8 FL (ref 80–99)
MONOCYTES # BLD: 0.55 K/UL (ref 0–1)
MONOCYTES NFR BLD: 10.1 % (ref 5–13)
NEUTS SEG # BLD: 2.19 K/UL (ref 1.8–8)
NEUTS SEG NFR BLD: 40.1 % (ref 32–75)
NRBC # BLD: 0 K/UL (ref 0–0.01)
NRBC BLD-RTO: 0 PER 100 WBC
PLATELET # BLD AUTO: 281 K/UL (ref 150–400)
PMV BLD AUTO: 9.9 FL (ref 8.9–12.9)
POTASSIUM SERPL-SCNC: 3.8 MMOL/L (ref 3.5–5.1)
RBC # BLD AUTO: 3.26 M/UL (ref 3.8–5.2)
SODIUM SERPL-SCNC: 139 MMOL/L (ref 136–145)
SPECIMEN HOLD: NORMAL
TIBC SERPL-MCNC: 240 UG/DL (ref 250–450)
TRIGL SERPL-MCNC: 115 MG/DL
VIT B12 SERPL-MCNC: 279 PG/ML (ref 193–986)
VLDLC SERPL CALC-MCNC: 23 MG/DL
WBC # BLD AUTO: 5.5 K/UL (ref 3.6–11)

## 2025-07-05 PROCEDURE — 2500000003 HC RX 250 WO HCPCS: Performed by: STUDENT IN AN ORGANIZED HEALTH CARE EDUCATION/TRAINING PROGRAM

## 2025-07-05 PROCEDURE — 82746 ASSAY OF FOLIC ACID SERUM: CPT

## 2025-07-05 PROCEDURE — 85025 COMPLETE CBC W/AUTO DIFF WBC: CPT

## 2025-07-05 PROCEDURE — 83550 IRON BINDING TEST: CPT

## 2025-07-05 PROCEDURE — 80048 BASIC METABOLIC PNL TOTAL CA: CPT

## 2025-07-05 PROCEDURE — 6370000000 HC RX 637 (ALT 250 FOR IP): Performed by: STUDENT IN AN ORGANIZED HEALTH CARE EDUCATION/TRAINING PROGRAM

## 2025-07-05 PROCEDURE — 6370000000 HC RX 637 (ALT 250 FOR IP): Performed by: NURSE PRACTITIONER

## 2025-07-05 PROCEDURE — 97535 SELF CARE MNGMENT TRAINING: CPT | Performed by: OCCUPATIONAL THERAPIST

## 2025-07-05 PROCEDURE — 6360000002 HC RX W HCPCS: Performed by: STUDENT IN AN ORGANIZED HEALTH CARE EDUCATION/TRAINING PROGRAM

## 2025-07-05 PROCEDURE — 80061 LIPID PANEL: CPT

## 2025-07-05 PROCEDURE — 83036 HEMOGLOBIN GLYCOSYLATED A1C: CPT

## 2025-07-05 PROCEDURE — 97112 NEUROMUSCULAR REEDUCATION: CPT | Performed by: OCCUPATIONAL THERAPIST

## 2025-07-05 PROCEDURE — 92523 SPEECH SOUND LANG COMPREHEN: CPT

## 2025-07-05 PROCEDURE — 99205 OFFICE O/P NEW HI 60 MIN: CPT | Performed by: PSYCHIATRY & NEUROLOGY

## 2025-07-05 PROCEDURE — G0378 HOSPITAL OBSERVATION PER HR: HCPCS

## 2025-07-05 PROCEDURE — 81001 URINALYSIS AUTO W/SCOPE: CPT

## 2025-07-05 PROCEDURE — 93306 TTE W/DOPPLER COMPLETE: CPT | Performed by: HOSPITALIST

## 2025-07-05 PROCEDURE — 82607 VITAMIN B-12: CPT

## 2025-07-05 PROCEDURE — 97162 PT EVAL MOD COMPLEX 30 MIN: CPT

## 2025-07-05 PROCEDURE — 96372 THER/PROPH/DIAG INJ SC/IM: CPT

## 2025-07-05 PROCEDURE — 97116 GAIT TRAINING THERAPY: CPT

## 2025-07-05 PROCEDURE — 97165 OT EVAL LOW COMPLEX 30 MIN: CPT | Performed by: OCCUPATIONAL THERAPIST

## 2025-07-05 PROCEDURE — 83540 ASSAY OF IRON: CPT

## 2025-07-05 PROCEDURE — 6370000000 HC RX 637 (ALT 250 FOR IP): Performed by: HOSPITALIST

## 2025-07-05 PROCEDURE — 82728 ASSAY OF FERRITIN: CPT

## 2025-07-05 PROCEDURE — 93306 TTE W/DOPPLER COMPLETE: CPT

## 2025-07-05 RX ORDER — AMLODIPINE BESYLATE 5 MG/1
10 TABLET ORAL DAILY
Status: CANCELLED | OUTPATIENT
Start: 2025-07-06

## 2025-07-05 RX ORDER — CLOPIDOGREL BISULFATE 75 MG/1
75 TABLET ORAL DAILY
Status: DISCONTINUED | OUTPATIENT
Start: 2025-07-05 | End: 2025-07-06 | Stop reason: HOSPADM

## 2025-07-05 RX ADMIN — ASPIRIN 81 MG: 81 TABLET, CHEWABLE ORAL at 09:39

## 2025-07-05 RX ADMIN — ATORVASTATIN CALCIUM 40 MG: 20 TABLET, FILM COATED ORAL at 09:38

## 2025-07-05 RX ADMIN — SODIUM CHLORIDE, PRESERVATIVE FREE 10 ML: 5 INJECTION INTRAVENOUS at 21:42

## 2025-07-05 RX ADMIN — SODIUM CHLORIDE, PRESERVATIVE FREE 10 ML: 5 INJECTION INTRAVENOUS at 09:41

## 2025-07-05 RX ADMIN — AMLODIPINE BESYLATE 10 MG: 5 TABLET ORAL at 09:39

## 2025-07-05 RX ADMIN — METOPROLOL SUCCINATE 50 MG: 50 TABLET, EXTENDED RELEASE ORAL at 09:38

## 2025-07-05 RX ADMIN — ESCITALOPRAM OXALATE 10 MG: 10 TABLET ORAL at 09:39

## 2025-07-05 RX ADMIN — ENOXAPARIN SODIUM 40 MG: 100 INJECTION SUBCUTANEOUS at 09:40

## 2025-07-05 RX ADMIN — CLOPIDOGREL BISULFATE 75 MG: 75 TABLET, FILM COATED ORAL at 18:46

## 2025-07-05 NOTE — PROGRESS NOTES
Stroke Education provided to patient and the following topics were discussed    1. Patients personal risk factors for stroke are hyperlipidemia and hypertension    2. Warning signs of Stroke:        * Sudden numbness or weakness of the face, arm or leg, especially on one side of          The body            * Sudden confusion, trouble speaking or understanding        * Sudden trouble seeing in one or both eyes        * Sudden trouble walking, dizziness, loss of balance or coordination        * Sudden severe headache with no known cause      3. Importance of activation Emergency Medical Services ( 9-1-1 ) immediately if experience any warning signs of stroke.    4. Be sure and schedule a follow-up appointment with your primary care doctor or any specialists as instructed.     5. You must take medicine every day to treat your risk factors for stroke.  Be sure to take your medicines exactly as your doctor tells you: no more, no less.  Know what your medicines are for , what they do.  Anti-thrombotics /anticoagulants can help prevent strokes.  You are taking the following medicine(s)  Lovenox, Asprin     6.  Smoking and second-hand smoke greatly increase your risk of stroke, cardiovascular disease and death. Smoking history ended year 1993    7. Information provided was [facility] Stroke Education Binder or Verbal Education    8. Documentation of teaching completed in Patient Education Activity and on Care Plan with teaching response noted?  yes

## 2025-07-05 NOTE — PLAN OF CARE
Problem: Occupational Therapy - Adult  Goal: By Discharge: Performs self-care activities at highest level of function for planned discharge setting.  See evaluation for individualized goals.  Description: FUNCTIONAL STATUS PRIOR TO ADMISSION:  Patient was ambulatory without adaptive equipment, reports independence with basic ADL's, endorses minimal cooking due to just her and her , drives close to home, fall ~1 year ago and 2 concussions, patient reported staying home and limiting going out in community due to her word finding deficits  Receives Help From: Family (spouse, son available to provide support, prn. Indep/Yarelis at baseline. Son will provide transportation/support once dc), Prior Level of Assist for ADLs: Independent,  ,  ,  ,  ,  ,  ,  , Prior Level of Assist for Transfers: Independent, Active : Yes     HOME SUPPORT: Patient lived with her  but didn't require assistance.     Occupational Therapy Goals:  Initiated 7/5/2025  1.  Patient will perform grooming with Modified [place] standing at the sink within 7 day(s).  2.  Patient will participate in MOCA with minimal encouragement within 7 day(s).  3.  Patient will perform all functional transfers with supervision and adaptive equipment prn within 7 day(s).   4.  Patient will identify 3 safety concerns set up in her room without cues within 7 day(s),   Outcome: Progressing     OCCUPATIONAL THERAPY EVALUATION    Patient: Bernie Linares (85 y.o. female)  Date: 7/5/2025  Primary Diagnosis: Expressive aphasia [R47.01]  Cerebrovascular accident (CVA), unspecified mechanism (HCC) [I63.9]         Precautions: Bed Alarm, Fall Risk                  ASSESSMENT :  The patient is limited by decreased functional mobility, independence in ADLs, high-level IADLs, safety awareness, cognition, command following, attention/concentration, balance, vision/visual deficit, decreased midline orientation.    Based on the impairments listed above she

## 2025-07-05 NOTE — DISCHARGE INSTRUCTIONS
HOSPITALIST DISCHARGE INSTRUCTIONS  NAME:  Bernie Linares   :  1940   MRN:  [id number]     Date/Time:  2025 10:52 AM    ADMIT DATE: 2025     DISCHARGE DATE: 2025     DISCHARGE DIAGNOSIS:  Word-finding difficulty (expressive aphasia)    DISCHARGE INSTRUCTIONS:  Thank you for allowing us to participate in your care. Your discharging Hospitalist is Abdirizak Monte MD. You were admitted for evaluation and treatment of the above.  CT scans of your head showed no evidence of stroke or bleeding.  You were unable to get an MRI of your brain due to metal in your body (?aneurysm clips).  Neurology evaluated you during your stay and recommended that you take clopidogrel, a blood thinner, for 21 days.  They also recommended that you take aspirin indefinitely.  Follow-up with neurology clinic as an outpatient.    MEDICATIONS:    It is important that you take the medication exactly as they are prescribed.   Keep your medication in the bottles provided by the pharmacist and keep a list of the medication names, dosages, and times to be taken in your wallet.   Do not take other medications without consulting your doctor.             If you experience any of the following symptoms then please call your primary care physician or return to the emergency room if you cannot get hold of your doctor:  Fever, chills, nausea, vomiting, diarrhea, change in mentation, falling, bleeding, shortness of breath    Follow Up:  Please call the below provider to arrange hospital follow up appointment      Babar Krueger MD  [address] [place] [address]  [phone]    Follow up        For questions regarding your Hospitalization or to contact the Hospital Medicine team, please call [phone]       Information obtained by :  I understand that if any problems occur once I am at home I am to contact my physician.    I understand and acknowledge receipt of the instructions indicated above.

## 2025-07-05 NOTE — PLAN OF CARE
Speech LAnguage Pathology EVALUATION    Patient: Bernie Linares (85 y.o. female)  Date: 7/5/2025  Primary Diagnosis: Expressive aphasia [R47.01]  Cerebrovascular accident (CVA), unspecified mechanism (HCC) [I63.9]       Precautions:                ASSESSMENT :  The patient was admitted with expressive language concerns (i.e. word finding difficulty) for 6-8 months with episodes occurring 3-4x/day. At the time of SLP evaluation, patient appears to have more cognitive communication deficits rather than aphasia. Patient participated in structured language tasks. Receptive language appears intact. Patient observed with mild difficulty on convergent and divergent naming tasks, benefiting from semantic and phonemic cueing for task completion. However deficits do not impede patient's functional communication during the time of SLP assessment. In addition, patient observed with hypophonic vocal quality, characterized by breathy voice with reduced loudness, and overall reduced pitch and loudness variability, ?etiology. She does endorse that her children are concerned with her voice.     Note Neurology consult pending at this time. Consider further ?neuropsych testing as this appears to be chronic in nature. Note patient has been under some stress ( currently in ED, sister recently passed away), however cognitive-communication deficits appear underlying. SLP will continue to follow acutely.    Patient will benefit from skilled intervention to address the above impairments.     PLAN :  Recommendations and Planned Interventions:  Diet: Regular and thin liquids  -- Defer to neurology regarding recommendation for neuropsych testing as OP    Language/Communication Strategies:  -- Description (i.e. what it looks, like it's used for, location)  -- Synonyms (i.e. a word that's similar in meaning)  -- Association (i.e. a relationship between the target word and other words)  -- Write it down (i.e. try to think of the first

## 2025-07-05 NOTE — ACP (ADVANCE CARE PLANNING)
[facility] Medicine                                   Advance Care Planning Note    Name: Bernie Linares  YOB: 1940  MRN: [id number]  Admission Date: 7/4/2025  2:35 PM    Date of discussion: 7/5/2025    Active Diagnoses:  Expressive aphasia  Recent concussion  CKD 3 AA  History of CVA  Hypertension        These active diagnoses are of sufficient risk that focused discussion on advance care planning is indicated in order to allow the patient to thoughtfully consider personal goals of care, and if situations arise that prevent the ability to personally give input, to ensure appropriate representation of their personal desires for different levels and aggressiveness of care.     Discussion:     Persons present and participating in discussion: Bernie Linares, Abdirizak Monte MD    Topics Discussed:  Patient's medical condition and diagnosis: [ x ] yes [  ] no   Surrogate decision maker: [ x ] yes [  ] no   Patient's current physical function/cognitive function/frailty: [x  ] yes [  ] no   Code Status: [x  ] yes [  ] no     Overview of Discussion: Bernie confirms that she is full code.  In the event that she is unable to make her own decisions, she would want her  to be her surrogate decision maker.  She previously filled out an MD POA form designating him as her surrogate decision-maker, but she says this is old and may be outdated.  I encouraged her to continue discussing her medical wishes with him in the event that she is unable to express these during an acute event in the future.      Time Spent:     Total time spent face-to-face in education and discussion: 20 minutes.     Abdirizak Monte MD  Date of Service:  7/5/2025  1:41 PM

## 2025-07-05 NOTE — TELEPHONE ENCOUNTER
Patient needs a hospital follow up appointment  Provider: ANY  In person or virtual: ANY  When: 6-8 weeks    Diagnosis/ Reason for follow-up:     Chronic progressive speech difficulty x 3 months (word finding difficulty, can't get words out, no slurred speech), and memory changes.      Remote hx of 2 cerebral aneurysms s/p clipping, remote hx of left occipital/ parietal stroke, hx of concussions 2 years ago.     Additional information (I.e, best phone number or family member to call, etc): Patient says to contact her to set up outpatient visits.

## 2025-07-05 NOTE — PLAN OF CARE
Problem: Physical Therapy - Adult  Goal: By Discharge: Performs mobility at highest level of function for planned discharge setting.  See evaluation for individualized goals.  Description: FUNCTIONAL STATUS PRIOR TO ADMISSION: Pt presents at AO x4. Pt reports feeling better and \"embarrassed\" re need for admission per initial presentation to hospital per spouse medical issues. Intermittent word finding deficits seen, but overall able to communicate needs. Pt is independent and ambulatory without dme at baseline and resides with her spouse in a multi level house with [facility] availability, 4 JUANITO with R/L HR. PMHx significant for trip and fall a little over a year ago, face forward and falls x 2 unable to brace fall including concussion. PHMx also significant for aneurysm with clip. (See medical chart). Pt admitted to [facility] 2/2 expressive aphasia. Pt referred for PT IE per resulting functional mobility decline.    HOME SUPPORT PRIOR TO ADMISSION:     Physical Therapy Goals  Initiated 7/5/2025  1.  Patient will move from supine to sit and sit to supine in bed with supervision/set-up within 7 day(s).    2.  Patient will perform sit to stand with supervision/set-up within 7 day(s).  3.  Patient will transfer from bed to chair and chair to bed with supervision/set-up using the least restrictive device within 7 day(s).  4.  Patient will ambulate with supervision/set-up for 200 feet with the least restrictive device within 7 day(s).   5.  Patient will ascend/descend 3-5 stairs with R/L handrail(s) with supervision/set-up within 7 day(s).    Outcome: Progressing     PHYSICAL THERAPY EVALUATION    Patient: Bernie Linares (85 y.o. female)  Date: 7/5/2025  Primary Diagnosis: Expressive aphasia [R47.01]  Cerebrovascular accident (CVA), unspecified mechanism (HCC) [I63.9]       Precautions: Restrictions/Precautions:  (falls, expressive aphasia)                      ASSESSMENT :   DEFICITS/IMPAIRMENTS:     Pt tolerated PT

## 2025-07-05 NOTE — CONSULTS
versus admission.  Son at bedside as well as patient states that her difficulty with speech has substantially worsened over the last 2 days and they are concerned with going home.  Will speak with hospitalist service    In d/w patient, she tells me that she had 2 separate aneurysm clippings many years ago, and that she had either one or two strokes in the past.  Regarding current issues, she tells me that she noticed difficulty with both word finding and ability to get words out starting around 3 months ago.  Denies any slurred speech. She denies any recent (last few days or weeks) change/ worsening of her speech.  Son reported to ED Physician that her speech worsened acutely in the 2 days prior to admission.     EKG 12-lead 7/4/2025: Sinus bradycardia HR      I reviewed the most recent LAB DATA:      BMP with , K3.8, CR 1.19 (elevated (, GFR 45 (reduced)  CBC with WBC 5.5, Hgb 10.4, HCT 32.2, platelet count 201,000  Tchol 184, Trig 115, HDL 53,       I reviewed the most recent IMAGING RESULTS (excerpted from the formal reports and modified to fit body of this note):     CT Head w/o contrast 7-4-2025 : FINDINGS: Stable postsurgical changes of left pterional and right temporal craniotomies with aneurysm clip noted in the left middle cranial fossa in the region of the left MCA bifurcation, and in the right suprasellar region in the area of the right carotid terminus/communicating segment. There is stable encephalomalacia in the left parietal and occipital lobes. The ventricles are normal in size and position. Basilar cisterns are patent. No midline shift. There is no evidence of acute infarct, hemorrhage, or extraaxial fluid collection. The paranasal sinuses, mastoid air cells, and middle ears are clear. The orbital contents are within normal limits with bilateral lens replacement.   IMPRESSION: 1. No evidence of acute intracranial abnormality. 2. Stable encephalomalacia in the left parietal and occipital

## 2025-07-05 NOTE — PROGRESS NOTES
Hospitalist Progress Note      NAME:  Bernie Linares   :  1940  MRM:  [id number]    Date/Time: 2025  7:36 AM           Assessment / Plan:     Bernie Linares is a 85 y.o. female with cerebral aneurysm status post clip placement, prior CVA, recent concussion, hypertension, and anxiety/depression.  She was admitted to this hospital on 2025 for evaluation/management of intermittent expressive aphasia.    #Expressive aphasia: Reason for admission.  CT head and CTA head/neck showed no evidence of acute abnormality.  Symptoms are intermittent and only started after recent fall with concussion.  Favor aphasia secondary to concussion, though cannot rule out CVA/TIA especially given her history of CVA and carotid disease.  Unable to get MRI due to presence of aneurysm clips.  -Await neurology input.  Appreciate their help  -Cancel brain MRI  -Continue aspirin and atorvastatin for now  -PT/OT  -Telemetry  -Neurochecks per protocol    #Hyperlipidemia:  this admission.    #Normocytic anemia: No clinically evident bleeding.  Iron studies not suggestive of iron deficiency anemia.    #CKD 3A: Creatinine at recent baseline.    #History of carotid stenosis: Status post bilateral carotid endarterectomies with no evidence of residual/recurrent stenosis seen on admission CTA head/neck.    #Hypertension  -Continue home amlodipine and metoprolol    #Recent concussion: Due to fall at home earlier this year.  Per history, fall was mechanical.    #Anxiety/depression  -Continue home escitalopram    I have personally reviewed the radiographs, laboratory data in Epic and decisions and statements above are based partially on this personal interpretation.                 Care Plan discussed with: Patient and Family ()    Discussed:  Code Status, Care Plan, and D/C Planning    Prophylaxis:  Lovenox    Disposition:  Home w/Family           ___________________________________________________    Attending

## 2025-07-06 VITALS
HEIGHT: 64 IN | WEIGHT: 164.4 LBS | DIASTOLIC BLOOD PRESSURE: 56 MMHG | RESPIRATION RATE: 15 BRPM | OXYGEN SATURATION: 96 % | TEMPERATURE: 97.4 F | BODY MASS INDEX: 28.07 KG/M2 | SYSTOLIC BLOOD PRESSURE: 139 MMHG | HEART RATE: 50 BPM

## 2025-07-06 LAB
ANION GAP SERPL CALC-SCNC: 6 MMOL/L (ref 2–12)
APPEARANCE UR: CLEAR
BACTERIA URNS QL MICRO: ABNORMAL /HPF
BASOPHILS # BLD: 0.03 K/UL (ref 0–0.1)
BASOPHILS NFR BLD: 0.5 % (ref 0–1)
BILIRUB UR QL: NEGATIVE
BUN SERPL-MCNC: 34 MG/DL (ref 6–20)
BUN/CREAT SERPL: 26 (ref 12–20)
CALCIUM SERPL-MCNC: 8.5 MG/DL (ref 8.5–10.1)
CHLORIDE SERPL-SCNC: 108 MMOL/L (ref 97–108)
CO2 SERPL-SCNC: 26 MMOL/L (ref 21–32)
COLOR UR: ABNORMAL
CREAT SERPL-MCNC: 1.31 MG/DL (ref 0.55–1.02)
DIFFERENTIAL METHOD BLD: ABNORMAL
EKG ATRIAL RATE: 56 BPM
EKG DIAGNOSIS: NORMAL
EKG P AXIS: 46 DEGREES
EKG P-R INTERVAL: 174 MS
EKG Q-T INTERVAL: 404 MS
EKG QRS DURATION: 80 MS
EKG QTC CALCULATION (BAZETT): 389 MS
EKG R AXIS: 2 DEGREES
EKG T AXIS: 40 DEGREES
EKG VENTRICULAR RATE: 56 BPM
EOSINOPHIL # BLD: 0.65 K/UL (ref 0–0.4)
EOSINOPHIL NFR BLD: 11.8 % (ref 0–7)
EPITH CASTS URNS QL MICRO: ABNORMAL /LPF
ERYTHROCYTE [DISTWIDTH] IN BLOOD BY AUTOMATED COUNT: 12.8 % (ref 11.5–14.5)
GLUCOSE SERPL-MCNC: 110 MG/DL (ref 65–100)
GLUCOSE UR STRIP.AUTO-MCNC: NEGATIVE MG/DL
HCT VFR BLD AUTO: 30.6 % (ref 35–47)
HGB BLD-MCNC: 10.1 G/DL (ref 11.5–16)
HGB UR QL STRIP: NEGATIVE
IMM GRANULOCYTES # BLD AUTO: 0.02 K/UL (ref 0–0.04)
IMM GRANULOCYTES NFR BLD AUTO: 0.4 % (ref 0–0.5)
KETONES UR QL STRIP.AUTO: NEGATIVE MG/DL
LEUKOCYTE ESTERASE UR QL STRIP.AUTO: NEGATIVE
LYMPHOCYTES # BLD: 2.17 K/UL (ref 0.8–3.5)
LYMPHOCYTES NFR BLD: 39.5 % (ref 12–49)
MCH RBC QN AUTO: 32.6 PG (ref 26–34)
MCHC RBC AUTO-ENTMCNC: 33 G/DL (ref 30–36.5)
MCV RBC AUTO: 98.7 FL (ref 80–99)
MONOCYTES # BLD: 0.71 K/UL (ref 0–1)
MONOCYTES NFR BLD: 12.9 % (ref 5–13)
NEUTS SEG # BLD: 1.91 K/UL (ref 1.8–8)
NEUTS SEG NFR BLD: 34.9 % (ref 32–75)
NITRITE UR QL STRIP.AUTO: NEGATIVE
NRBC # BLD: 0 K/UL (ref 0–0.01)
NRBC BLD-RTO: 0 PER 100 WBC
PH UR STRIP: 5.5 (ref 5–8)
PLATELET # BLD AUTO: 293 K/UL (ref 150–400)
PMV BLD AUTO: 10.1 FL (ref 8.9–12.9)
POTASSIUM SERPL-SCNC: 4.2 MMOL/L (ref 3.5–5.1)
PROT UR STRIP-MCNC: 100 MG/DL
RBC # BLD AUTO: 3.1 M/UL (ref 3.8–5.2)
RBC #/AREA URNS HPF: ABNORMAL /HPF (ref 0–5)
SODIUM SERPL-SCNC: 140 MMOL/L (ref 136–145)
SP GR UR REFRACTOMETRY: 1.02 (ref 1–1.03)
UROBILINOGEN UR QL STRIP.AUTO: 1 EU/DL (ref 0.2–1)
WBC # BLD AUTO: 5.5 K/UL (ref 3.6–11)
WBC URNS QL MICRO: ABNORMAL /HPF (ref 0–4)

## 2025-07-06 PROCEDURE — 6370000000 HC RX 637 (ALT 250 FOR IP): Performed by: NURSE PRACTITIONER

## 2025-07-06 PROCEDURE — 85025 COMPLETE CBC W/AUTO DIFF WBC: CPT

## 2025-07-06 PROCEDURE — 93010 ELECTROCARDIOGRAM REPORT: CPT | Performed by: HOSPITALIST

## 2025-07-06 PROCEDURE — 36415 COLL VENOUS BLD VENIPUNCTURE: CPT

## 2025-07-06 PROCEDURE — 2500000003 HC RX 250 WO HCPCS: Performed by: STUDENT IN AN ORGANIZED HEALTH CARE EDUCATION/TRAINING PROGRAM

## 2025-07-06 PROCEDURE — 6360000002 HC RX W HCPCS: Performed by: STUDENT IN AN ORGANIZED HEALTH CARE EDUCATION/TRAINING PROGRAM

## 2025-07-06 PROCEDURE — 6370000000 HC RX 637 (ALT 250 FOR IP): Performed by: HOSPITALIST

## 2025-07-06 PROCEDURE — 96372 THER/PROPH/DIAG INJ SC/IM: CPT

## 2025-07-06 PROCEDURE — 94761 N-INVAS EAR/PLS OXIMETRY MLT: CPT

## 2025-07-06 PROCEDURE — G0378 HOSPITAL OBSERVATION PER HR: HCPCS

## 2025-07-06 PROCEDURE — 80048 BASIC METABOLIC PNL TOTAL CA: CPT

## 2025-07-06 PROCEDURE — 6370000000 HC RX 637 (ALT 250 FOR IP): Performed by: STUDENT IN AN ORGANIZED HEALTH CARE EDUCATION/TRAINING PROGRAM

## 2025-07-06 RX ORDER — ATORVASTATIN CALCIUM 40 MG/1
40 TABLET, FILM COATED ORAL DAILY
Qty: 30 TABLET | Refills: 1 | Status: SHIPPED | OUTPATIENT
Start: 2025-07-06

## 2025-07-06 RX ORDER — CLOPIDOGREL BISULFATE 75 MG/1
75 TABLET ORAL DAILY
Qty: 19 TABLET | Refills: 0 | Status: SHIPPED | OUTPATIENT
Start: 2025-07-07

## 2025-07-06 RX ORDER — ASPIRIN 81 MG/1
81 TABLET ORAL DAILY
Qty: 30 TABLET | Refills: 3 | Status: SHIPPED | OUTPATIENT
Start: 2025-07-06

## 2025-07-06 RX ADMIN — SODIUM CHLORIDE, PRESERVATIVE FREE 10 ML: 5 INJECTION INTRAVENOUS at 08:41

## 2025-07-06 RX ADMIN — ESCITALOPRAM OXALATE 10 MG: 10 TABLET ORAL at 08:41

## 2025-07-06 RX ADMIN — AMLODIPINE BESYLATE 10 MG: 5 TABLET ORAL at 08:41

## 2025-07-06 RX ADMIN — ATORVASTATIN CALCIUM 40 MG: 20 TABLET, FILM COATED ORAL at 08:41

## 2025-07-06 RX ADMIN — ASPIRIN 81 MG: 81 TABLET, CHEWABLE ORAL at 08:41

## 2025-07-06 RX ADMIN — METOPROLOL SUCCINATE 50 MG: 50 TABLET, EXTENDED RELEASE ORAL at 08:41

## 2025-07-06 RX ADMIN — CLOPIDOGREL BISULFATE 75 MG: 75 TABLET, FILM COATED ORAL at 08:41

## 2025-07-06 RX ADMIN — ENOXAPARIN SODIUM 40 MG: 100 INJECTION SUBCUTANEOUS at 08:41

## 2025-07-06 NOTE — DISCHARGE SUMMARY
Hospitalist Discharge Summary     Patient ID:  Bernie Linares  [id number]  85 y.o.  1940    Admit date: 7/4/2025    Discharge date and time: 7/6/2025    Admission Diagnoses: Expressive aphasia [R47.01]  Cerebrovascular accident (CVA), unspecified mechanism (HCC) [I63.9]    Discharge Diagnoses:    Principal Problem (Resolved):    Expressive aphasia  Active Problems:    * No active hospital problems. *         Hospital Course:   From admission H&P dated 7/4/2025: \"Bernie is a 85 y.o.  female with PMHx cerebral aneurysm status post clip placement (90s) CVA (90s) recurrent falls with 2 concussions this year, hypertension, CKD, anxiety and depression who is presenting for word finding difficulty.     Patient was in the emergency department today with her  who came in for chest pain.  While her  was being evaluated, she was talking to the emergency room doctor who noticed that she was \"scrambling\" her words.  The doctor was concerned and brought her into the emergency department for testing.     Her son is at bedside who helps with history.  Patient has had recurrent falls with 2 major concussions this year.  Following the concussions, she has started having word finding difficulty.  This has been going on for the last 6 to 8 months.  It occurs about 3-4 times a day, lasting only minutes.  She feels confused and flustered when it happens.  She feels like she trips over her words and forgets what she wants to say.  It is not associated with any weakness, sensory loss, or convulsions.  She has no history of seizures.  This occurs last when she is around family.  Son does note she has been under a lot of stress lately.  She lost her daughter during this time last year.  And she lost her sister 1 months ago.     ED Course  - Vitals: Afebrile.  Hypertensive.  On room air.  - Labs: Creatinine 1.37 (baseline 1.3-1.6).  Hemoglobin 11.3.  Otherwise unremarkable.\"    MRI could not be completed as

## 2025-07-10 ENCOUNTER — HOSPITAL ENCOUNTER (EMERGENCY)
Facility: HOSPITAL | Age: 85
Discharge: HOME | End: 2025-07-10
Attending: EMERGENCY MEDICINE
Payer: MEDICARE

## 2025-07-10 ENCOUNTER — APPOINTMENT (OUTPATIENT)
Facility: HOSPITAL | Age: 85
End: 2025-07-10
Payer: MEDICARE

## 2025-07-10 VITALS
BODY MASS INDEX: 26.61 KG/M2 | SYSTOLIC BLOOD PRESSURE: 158 MMHG | RESPIRATION RATE: 16 BRPM | WEIGHT: 155 LBS | OXYGEN SATURATION: 100 % | DIASTOLIC BLOOD PRESSURE: 62 MMHG | HEART RATE: 49 BPM
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
## 2025-07-10 PROCEDURE — 99283 EMERGENCY DEPT VISIT LOW MDM: CPT

## 2025-07-10 PROCEDURE — 73630 X-RAY EXAM OF FOOT: CPT

## 2025-07-10 RX ORDER — GINSENG 100 MG
CAPSULE ORAL
Status: DISCONTINUED | OUTPATIENT
Start: 2025-07-10 | End: 2025-07-10 | Stop reason: HOSPADM

## 2025-07-10 ASSESSMENT — PAIN - FUNCTIONAL ASSESSMENT: PAIN_FUNCTIONAL_ASSESSMENT: 0-10

## 2025-07-10 ASSESSMENT — PAIN SCALES - GENERAL: PAINLEVEL_OUTOF10: 6

## 2025-07-10 NOTE — ED PROVIDER NOTES
[facility] EMERGENCY DEPARTMENT  EMERGENCY DEPARTMENT ENCOUNTER      Pt Name: Bernie Linares  MRN: [id number]  Birthdate 1940  Date of evaluation: 7/10/2025  Provider: Guero Arora DO    CHIEF COMPLAINT       Chief Complaint   Patient presents with    Fall         HISTORY OF PRESENT ILLNESS   (Location/Symptom, Timing/Onset, Context/Setting, Quality, Duration, Modifying Factors, Severity)  Note limiting factors.   85-year-old female comes in for fall.  She was visiting here in the hospital when she turned and hit the door frame with her shoulder and head and then fell to the floor.  She did hit her head when she fell.  She states that she hurt her right foot when she fell and also experienced a laceration to her left forearm.  No loss of consciousness.  Patient was a visit a rapid response and brought to the emergency department for evaluation.    The history is provided by the patient.         Review of External Medical Records:     Nursing Notes were reviewed.    REVIEW OF SYSTEMS    (2-9 systems for level 4, 10 or more for level 5)     Review of Systems    Except as noted above the remainder of the review of systems was reviewed and negative.       PAST MEDICAL HISTORY     Past Medical History:   Diagnosis Date    Aneurysm, cerebral     bilat    Heart failure (HCC)     High cholesterol     Meningitis     Pancreatic stones          SURGICAL HISTORY       Past Surgical History:   Procedure Laterality Date    CARDIAC CATHETERIZATION      stent Lcflx 2002    CAROTID ENDARTERECTOMY      bilat    OTHER SURGICAL HISTORY  3.2002    EF 60% PaSP 52 mmhg, mild MR     OTHER SURGICAL HISTORY  5-    Nuclear stress negative for ischemia, EF 70%         CURRENT MEDICATIONS       Discharge Medication List as of 7/10/2025  4:48 PM        CONTINUE these medications which have NOT CHANGED    Details   clopidogrel (PLAVIX) 75 MG tablet Take 1 tablet by mouth daily, Disp-19 tablet, R-0Normal  (155 lb)           Medical Decision Making  85-year-old female comes in after fall.  Well-appearing female, she basically bumped into the door frame and then fell over.  Not sustaining any major injury on the fall, not strike she complains of a small laceration and skin tear which will be cleaned and covered but no formal repair needed.  Complains of right foot pain that was very painful to step on but as she continued to walk up much better.  Will image her foot to evaluate for potential fracture    Here, imaging unremarkable.  Patient skin tear is cleaned and wrapped.  Patient safely discharged    Amount and/or Complexity of Data Reviewed  Radiology: ordered. Decision-making details documented in ED Course.            REASSESSMENT            CONSULTS:  None    PROCEDURES:  Unless otherwise noted below, none     Procedures      FINAL IMPRESSION      1. Fall, initial encounter          DISPOSITION/PLAN   DISPOSITION Decision To Discharge 07/10/2025 04:47:56 PM      PATIENT REFERRED TO:  Babar rKueger MD  [id number] [address] [address] [phone]    Schedule an appointment as soon as possible for a visit         DISCHARGE MEDICATIONS:  Discharge Medication List as of 7/10/2025  4:48 PM            (Please note that portions of this note were completed with a voice recognition program.  Efforts were made to edit the dictations but occasionally words are mis-transcribed.)    Guero Arora DO (electronically signed)  Emergency Attending Physician / Physician Assistant / Nurse Practitioner             Guero Arora DO  07/10/25 2012

## 2025-07-10 NOTE — ED TRIAGE NOTES
Patient arrives to the ER via wheelchair after fall while visiting a patient in the hospital.     Patient reports that she was walking in the room when she tripped, fell sideways and hit her head on the side of the doorframe. Patient also has a small laceration to left arm.     Denies any LOC or taking blood thinners.

## 2025-07-19 ENCOUNTER — TRANSCRIBE ORDERS (OUTPATIENT)
Facility: HOSPITAL | Age: 85
End: 2025-07-19

## 2025-07-19 DIAGNOSIS — Z12.31 ENCOUNTER FOR SCREENING MAMMOGRAM FOR MALIGNANT NEOPLASM OF BREAST: Primary | ICD-10-CM

## 2025-08-21 ENCOUNTER — OFFICE VISIT (OUTPATIENT)
Age: 85
End: 2025-08-21
Payer: MEDICARE

## 2025-08-21 VITALS
WEIGHT: 151 LBS | HEART RATE: 67 BPM | BODY MASS INDEX: 25.78 KG/M2 | HEIGHT: 64 IN | RESPIRATION RATE: 16 BRPM | OXYGEN SATURATION: 97 % | SYSTOLIC BLOOD PRESSURE: 112 MMHG | DIASTOLIC BLOOD PRESSURE: 68 MMHG

## 2025-08-21 DIAGNOSIS — R47.89 WORD FINDING DIFFICULTY: ICD-10-CM

## 2025-08-21 DIAGNOSIS — Z09 HOSPITAL DISCHARGE FOLLOW-UP: Primary | ICD-10-CM

## 2025-08-21 DIAGNOSIS — R41.89 MODERATE COGNITIVE IMPAIRMENT: ICD-10-CM

## 2025-08-21 PROCEDURE — 1123F ACP DISCUSS/DSCN MKR DOCD: CPT

## 2025-08-21 PROCEDURE — 3074F SYST BP LT 130 MM HG: CPT

## 2025-08-21 PROCEDURE — 1159F MED LIST DOCD IN RCRD: CPT

## 2025-08-21 PROCEDURE — 1111F DSCHRG MED/CURRENT MED MERGE: CPT

## 2025-08-21 PROCEDURE — 3078F DIAST BP <80 MM HG: CPT

## 2025-08-21 PROCEDURE — 99215 OFFICE O/P EST HI 40 MIN: CPT

## 2025-08-21 PROCEDURE — 1160F RVW MEDS BY RX/DR IN RCRD: CPT

## 2025-08-21 RX ORDER — LORATADINE 10 MG/1
10 TABLET ORAL DAILY PRN
COMMUNITY

## 2025-08-21 RX ORDER — LOSARTAN POTASSIUM 50 MG/1
50 TABLET ORAL DAILY
COMMUNITY

## 2025-08-21 ASSESSMENT — PATIENT HEALTH QUESTIONNAIRE - PHQ9
SUM OF ALL RESPONSES TO PHQ QUESTIONS 1-9: 2
1. LITTLE INTEREST OR PLEASURE IN DOING THINGS: SEVERAL DAYS
SUM OF ALL RESPONSES TO PHQ QUESTIONS 1-9: 2
2. FEELING DOWN, DEPRESSED OR HOPELESS: SEVERAL DAYS
SUM OF ALL RESPONSES TO PHQ QUESTIONS 1-9: 2
SUM OF ALL RESPONSES TO PHQ QUESTIONS 1-9: 2

## 2025-08-21 ASSESSMENT — VISUAL ACUITY: VA_NORMAL: 1

## 2025-08-22 ENCOUNTER — CLINICAL DOCUMENTATION (OUTPATIENT)
Age: 85
End: 2025-08-22

## 2025-08-22 ENCOUNTER — TELEPHONE (OUTPATIENT)
Age: 85
End: 2025-08-22

## 2025-08-23 ENCOUNTER — HOSPITAL ENCOUNTER (EMERGENCY)
Facility: HOSPITAL | Age: 85
Discharge: HOME | End: 2025-08-23
Attending: EMERGENCY MEDICINE
Payer: MEDICARE

## 2025-08-23 ENCOUNTER — APPOINTMENT (OUTPATIENT)
Facility: HOSPITAL | Age: 85
End: 2025-08-23
Payer: MEDICARE

## 2025-08-23 VITALS
HEART RATE: 78 BPM | WEIGHT: 156 LBS | TEMPERATURE: 97.8 F | DIASTOLIC BLOOD PRESSURE: 56 MMHG | HEIGHT: 64 IN | OXYGEN SATURATION: 95 % | BODY MASS INDEX: 26.63 KG/M2 | RESPIRATION RATE: 19 BRPM | SYSTOLIC BLOOD PRESSURE: 155 MMHG

## 2025-08-23 DIAGNOSIS — R00.2 PALPITATIONS: Primary | ICD-10-CM

## 2025-08-23 LAB
ALBUMIN SERPL-MCNC: 4 G/DL (ref 3.5–5.2)
ALBUMIN/GLOB SERPL: 1.3 (ref 1.1–2.2)
ALP SERPL-CCNC: 82 U/L (ref 35–104)
ALT SERPL-CCNC: 9 U/L (ref 10–35)
ANION GAP SERPL CALC-SCNC: 15 MMOL/L (ref 2–12)
AST SERPL-CCNC: 23 U/L (ref 10–35)
BASOPHILS # BLD: 0.04 K/UL (ref 0–0.1)
BASOPHILS NFR BLD: 0.5 % (ref 0–1)
BILIRUB SERPL-MCNC: 0.4 MG/DL (ref 0–1.2)
BUN SERPL-MCNC: 35 MG/DL (ref 8–23)
BUN/CREAT SERPL: 27 (ref 12–20)
CALCIUM SERPL-MCNC: 9.6 MG/DL (ref 8.8–10.2)
CHLORIDE SERPL-SCNC: 107 MMOL/L (ref 98–107)
CO2 SERPL-SCNC: 23 MMOL/L (ref 22–29)
COMMENT:: NORMAL
CREAT SERPL-MCNC: 1.29 MG/DL (ref 0.5–0.9)
DIFFERENTIAL METHOD BLD: ABNORMAL
EOSINOPHIL # BLD: 0.72 K/UL (ref 0–0.4)
EOSINOPHIL NFR BLD: 9.1 % (ref 0–7)
ERYTHROCYTE [DISTWIDTH] IN BLOOD BY AUTOMATED COUNT: 12.8 % (ref 11.5–14.5)
GLOBULIN SER CALC-MCNC: 3.1 G/DL (ref 2–4)
GLUCOSE SERPL-MCNC: 95 MG/DL (ref 65–100)
HCT VFR BLD AUTO: 35.7 % (ref 35–47)
HGB BLD-MCNC: 11.6 G/DL (ref 11.5–16)
IMM GRANULOCYTES # BLD AUTO: 0.06 K/UL (ref 0–0.04)
IMM GRANULOCYTES NFR BLD AUTO: 0.8 % (ref 0–0.5)
LYMPHOCYTES # BLD: 1.83 K/UL (ref 0.8–3.5)
LYMPHOCYTES NFR BLD: 23.1 % (ref 12–49)
MCH RBC QN AUTO: 30.9 PG (ref 26–34)
MCHC RBC AUTO-ENTMCNC: 32.5 G/DL (ref 30–36.5)
MCV RBC AUTO: 94.9 FL (ref 80–99)
MONOCYTES # BLD: 0.79 K/UL (ref 0–1)
MONOCYTES NFR BLD: 10 % (ref 5–13)
NEUTS SEG # BLD: 4.49 K/UL (ref 1.8–8)
NEUTS SEG NFR BLD: 56.5 % (ref 32–75)
NRBC # BLD: 0 K/UL (ref 0–0.01)
NRBC BLD-RTO: 0 PER 100 WBC
PLATELET # BLD AUTO: 375 K/UL (ref 150–400)
PMV BLD AUTO: 9.7 FL (ref 8.9–12.9)
POTASSIUM SERPL-SCNC: 4.6 MMOL/L (ref 3.5–5.1)
PROT SERPL-MCNC: 7.1 G/DL (ref 6.4–8.3)
RBC # BLD AUTO: 3.76 M/UL (ref 3.8–5.2)
SODIUM SERPL-SCNC: 145 MMOL/L (ref 136–145)
SPECIMEN HOLD: NORMAL
TROPONIN T SERPL HS-MCNC: 21 NG/L (ref 0–14)
TROPONIN T SERPL HS-MCNC: 21.9 NG/L (ref 0–14)
WBC # BLD AUTO: 7.9 K/UL (ref 3.6–11)

## 2025-08-23 PROCEDURE — 84484 ASSAY OF TROPONIN QUANT: CPT

## 2025-08-23 PROCEDURE — 71046 X-RAY EXAM CHEST 2 VIEWS: CPT

## 2025-08-23 PROCEDURE — 36415 COLL VENOUS BLD VENIPUNCTURE: CPT

## 2025-08-23 PROCEDURE — 80053 COMPREHEN METABOLIC PANEL: CPT

## 2025-08-23 PROCEDURE — 99285 EMERGENCY DEPT VISIT HI MDM: CPT

## 2025-08-23 PROCEDURE — 85025 COMPLETE CBC W/AUTO DIFF WBC: CPT

## 2025-08-23 PROCEDURE — 93005 ELECTROCARDIOGRAM TRACING: CPT | Performed by: EMERGENCY MEDICINE

## 2025-08-23 RX ORDER — HYDROXYZINE HYDROCHLORIDE 25 MG/1
25 TABLET, FILM COATED ORAL EVERY 8 HOURS PRN
Qty: 30 TABLET | Refills: 0 | Status: SHIPPED | OUTPATIENT
Start: 2025-08-23 | End: 2025-09-02

## 2025-08-23 ASSESSMENT — PAIN DESCRIPTION - LOCATION: LOCATION: CHEST

## 2025-08-23 ASSESSMENT — PAIN - FUNCTIONAL ASSESSMENT: PAIN_FUNCTIONAL_ASSESSMENT: 0-10

## 2025-08-23 ASSESSMENT — PAIN SCALES - GENERAL: PAINLEVEL_OUTOF10: 4

## 2025-08-24 LAB
EKG ATRIAL RATE: 70 BPM
EKG DIAGNOSIS: NORMAL
EKG P AXIS: 47 DEGREES
EKG P-R INTERVAL: 178 MS
EKG Q-T INTERVAL: 382 MS
EKG QRS DURATION: 86 MS
EKG QTC CALCULATION (BAZETT): 412 MS
EKG R AXIS: 11 DEGREES
EKG T AXIS: 55 DEGREES
EKG VENTRICULAR RATE: 70 BPM

## 2025-08-24 PROCEDURE — 93010 ELECTROCARDIOGRAM REPORT: CPT | Performed by: HOSPITALIST

## 2025-08-28 ENCOUNTER — TELEPHONE (OUTPATIENT)
Age: 85
End: 2025-08-28

## 2025-09-02 ENCOUNTER — RESULTS FOLLOW-UP (OUTPATIENT)
Age: 85
End: 2025-09-02

## 2025-09-04 ENCOUNTER — HOSPITAL ENCOUNTER (OUTPATIENT)
Facility: HOSPITAL | Age: 85
Discharge: HOME | End: 2025-09-04
Payer: MEDICARE

## 2025-09-04 DIAGNOSIS — Z12.31 ENCOUNTER FOR SCREENING MAMMOGRAM FOR MALIGNANT NEOPLASM OF BREAST: ICD-10-CM

## 2025-09-04 PROCEDURE — 77063 BREAST TOMOSYNTHESIS BI: CPT
